# Patient Record
Sex: MALE | Race: WHITE | NOT HISPANIC OR LATINO | Employment: OTHER | URBAN - METROPOLITAN AREA
[De-identification: names, ages, dates, MRNs, and addresses within clinical notes are randomized per-mention and may not be internally consistent; named-entity substitution may affect disease eponyms.]

---

## 2017-01-31 ENCOUNTER — OFFICE VISIT (OUTPATIENT)
Dept: INTERNAL MEDICINE | Facility: CLINIC | Age: 57
End: 2017-01-31
Payer: MEDICARE

## 2017-01-31 VITALS
HEART RATE: 72 BPM | DIASTOLIC BLOOD PRESSURE: 80 MMHG | RESPIRATION RATE: 18 BRPM | OXYGEN SATURATION: 98 % | BODY MASS INDEX: 21.29 KG/M2 | HEIGHT: 66 IN | TEMPERATURE: 98 F | WEIGHT: 132.5 LBS | SYSTOLIC BLOOD PRESSURE: 118 MMHG

## 2017-01-31 DIAGNOSIS — K50.919 CROHN'S DISEASE WITH COMPLICATION, UNSPECIFIED GASTROINTESTINAL TRACT LOCATION: ICD-10-CM

## 2017-01-31 DIAGNOSIS — E78.2 HYPERLIPIDEMIA, MIXED: Chronic | ICD-10-CM

## 2017-01-31 DIAGNOSIS — R19.8 RUQ FULLNESS: ICD-10-CM

## 2017-01-31 DIAGNOSIS — E80.4 GILBERTS SYNDROME: Chronic | ICD-10-CM

## 2017-01-31 DIAGNOSIS — F51.05 INSOMNIA DUE TO ANXIETY AND FEAR: Chronic | ICD-10-CM

## 2017-01-31 DIAGNOSIS — J32.9 SINUSITIS, UNSPECIFIED CHRONICITY, UNSPECIFIED LOCATION: Primary | ICD-10-CM

## 2017-01-31 DIAGNOSIS — B20 HIV DISEASE: ICD-10-CM

## 2017-01-31 DIAGNOSIS — F41.9 ANXIETY: ICD-10-CM

## 2017-01-31 DIAGNOSIS — F40.9 INSOMNIA DUE TO ANXIETY AND FEAR: Chronic | ICD-10-CM

## 2017-01-31 PROCEDURE — 99214 OFFICE O/P EST MOD 30 MIN: CPT | Mod: S$GLB,,, | Performed by: INTERNAL MEDICINE

## 2017-01-31 RX ORDER — LORAZEPAM 2 MG/1
2 TABLET ORAL NIGHTLY
Qty: 30 TABLET | Refills: 5 | Status: SHIPPED | OUTPATIENT
Start: 2017-01-31 | End: 2017-03-02 | Stop reason: SDUPTHER

## 2017-01-31 RX ORDER — FENOFIBRATE 145 MG/1
145 TABLET, FILM COATED ORAL DAILY
Qty: 90 TABLET | Refills: 3 | Status: SHIPPED | OUTPATIENT
Start: 2017-01-31 | End: 2017-06-27 | Stop reason: SDUPTHER

## 2017-01-31 RX ORDER — OMEGA-3-ACID ETHYL ESTERS 1 G/1
1 CAPSULE, LIQUID FILLED ORAL 2 TIMES DAILY
Qty: 180 CAPSULE | Refills: 3 | Status: SHIPPED | OUTPATIENT
Start: 2017-01-31 | End: 2017-06-27 | Stop reason: SDUPTHER

## 2017-01-31 RX ORDER — METHYLPREDNISOLONE 4 MG/1
TABLET ORAL
Qty: 1 PACKAGE | Refills: 0 | Status: SHIPPED | OUTPATIENT
Start: 2017-01-31 | End: 2017-06-27

## 2017-01-31 RX ORDER — CLONAZEPAM 1 MG/1
1 TABLET ORAL DAILY
Qty: 30 TABLET | Refills: 5 | Status: SHIPPED | OUTPATIENT
Start: 2017-01-31 | End: 2017-03-02 | Stop reason: SDUPTHER

## 2017-01-31 RX ORDER — PRAVASTATIN SODIUM 20 MG/1
20 TABLET ORAL NIGHTLY
Qty: 90 TABLET | Refills: 3 | Status: SHIPPED | OUTPATIENT
Start: 2017-01-31 | End: 2017-06-27 | Stop reason: SDUPTHER

## 2017-01-31 RX ORDER — KETOCONAZOLE 20 MG/ML
SHAMPOO, SUSPENSION TOPICAL
COMMUNITY
Start: 2017-01-04 | End: 2017-06-27 | Stop reason: SDUPTHER

## 2017-01-31 RX ORDER — SILDENAFIL 100 MG/1
100 TABLET, FILM COATED ORAL DAILY PRN
Qty: 10 TABLET | Refills: 3 | Status: SHIPPED | OUTPATIENT
Start: 2017-01-31 | End: 2017-08-24 | Stop reason: SDUPTHER

## 2017-01-31 NOTE — PROGRESS NOTES
"Subjective:      Patient ID: Clint England is a 56 y.o. male.    Chief Complaint: Otalgia; Headache; and Medication change (Insurance is no longer covering Mometasone Furoate 50mcg spray)    HPI: 56y/oWM, litany of observations/complaints:  - right ear stopped up for quite awhile. He tried Auralgan on several occasions, but it just " ran out of his ear".  - dizziness, nino. When he is on a ladder..  - feels like his RUQ area is bigger than his left, and has often noticed it and wants to know why.  -had a bladder cancer, and want to know if it is everywhere, and what testing can he have for this  (sees Dr. Forte for this).  -told he has Crohn's ( by Dr. Wong), no current attacks.  -can't sleep, anxious,worried.  -sees derm/rahel for hair....  Review of Systems   HENT: Positive for ear pain, facial swelling, postnasal drip, sinus pressure, sneezing and tinnitus.         Exposures: dust,sanding,painting,cleaning.  Use and overuse of nasal sprays.   Respiratory: Negative for cough and shortness of breath.    Cardiovascular: Negative for chest pain and palpitations.   Gastrointestinal: Negative.    Genitourinary: Negative.    Neurological: Positive for dizziness and light-headedness. Negative for seizures.   All other systems reviewed and are negative.      Objective:     Visit Vitals    /80 (BP Location: Right arm, Patient Position: Sitting, BP Method: Manual)    Pulse 72    Temp 97.9 °F (36.6 °C) (Oral)    Resp 18    Ht 5' 6" (1.676 m)    Wt 60.1 kg (132 lb 7.9 oz)    SpO2 98%    BMI 21.39 kg/m2       Physical Exam   Constitutional: He is oriented to person, place, and time. He appears well-developed and well-nourished.   HENT:   Head: Normocephalic and atraumatic.   Right Ear: Tympanic membrane, external ear and ear canal normal.   Left Ear: Tympanic membrane, external ear and ear canal normal.   Nose: Nose normal.   Mouth/Throat: Oropharynx is clear and moist.   Eyes: Conjunctivae and EOM are " normal. Pupils are equal, round, and reactive to light.   Neck: Normal range of motion. Neck supple.   Cardiovascular: Normal rate, regular rhythm and normal heart sounds.    Pulmonary/Chest: Effort normal and breath sounds normal.   Abdominal: Soft. Normal appearance and bowel sounds are normal. He exhibits no shifting dullness. There is no hepatosplenomegaly. There is no tenderness. There is no CVA tenderness. No hernia.   Musculoskeletal: Normal range of motion.   Neurological: He is alert and oriented to person, place, and time.   Skin: Skin is warm and dry.   Psychiatric: He has a normal mood and affect. His behavior is normal.   Nursing note and vitals reviewed.      Assessment:     1. Sinusitis, unspecified chronicity, unspecified location    2. HIV disease    3. RUQ fullness    4. Hyperlipidemia, mixed    5. Insomnia due to anxiety and fear    6. Anxiety    7. Crohn's disease with complication, unspecified gastrointestinal tract location    8. [place] syndrome      Plan:     Sinusitis, unspecified chronicity, unspecified location  -     methylPREDNISolone (MEDROL DOSEPACK) 4 mg tablet; use as directed  Dispense: 1 Package; Refill: 0  -     CBC auto differential; Future; Expected date: 1/31/17  -     Comprehensive metabolic panel; Future; Expected date: 1/31/17  -     Ambulatory referral to Smoking Cessation Program    HIV disease  -     raltegravir (ISENTRESS) 400 mg tablet; Take 1 tablet (400 mg total) by mouth 2 (two) times daily.  Dispense: 180 tablet; Refill: 3  -     emtricitab-rilpivirine-tenofov (COMPLERA) 200- mg Tab; Take 1 tablet by mouth once daily.  Dispense: 90 tablet; Refill: 3  -     CD4 T-[place] Cells; Future; Expected date: 1/31/17  -     HIV RNA, quantitative, PCR; Future; Expected date: 1/31/17    RUQ fullness  -     Cancel: CT Abdomen Pelvis With Contrast; Future; Expected date: 1/31/17  -     CT Abdomen Pelvis W Wo Contrast; Future; Expected date: 1/31/17    Hyperlipidemia,  mixed  -     omega-3 acid ethyl esters (LOVAZA) 1 gram capsule; Take 1 capsule (1 g total) by mouth 2 (two) times daily.  Dispense: 180 capsule; Refill: 3  -     pravastatin (PRAVACHOL) 20 MG tablet; Take 1 tablet (20 mg total) by mouth nightly.  Dispense: 90 tablet; Refill: 3  -     fenofibrate (TRICOR) 145 MG tablet; Take 1 tablet (145 mg total) by mouth once daily.  Dispense: 90 tablet; Refill: 3  -     Lipid panel; Future; Expected date: 1/31/17    Insomnia due to anxiety and fear  -     lorazepam (ATIVAN) 2 MG Tab; Take 1 tablet (2 mg total) by mouth every evening.  Dispense: 30 tablet; Refill: 5    Anxiety  -     clonazePAM (KLONOPIN) 1 MG tablet; Take 1 tablet (1 mg total) by mouth once daily.  Dispense: 30 tablet; Refill: 5    Crohn's disease with complication, unspecified gastrointestinal tract location    [place] syndrome    Other orders  -     sildenafil (VIAGRA) 100 MG tablet; Take 1 tablet (100 mg total) by mouth daily as needed for Erectile Dysfunction.  Dispense: 10 tablet; Refill: 3

## 2017-01-31 NOTE — MR AVS SNAPSHOT
[facility]  [address],  [address]  [place] [address]  Phone: [phone]  Fax: [phone]                  Clint England   2017 10:00 AM   Office Visit    Description:  Male : 1960   Provider:  Felisha Ruiz MD   Department:  [facility]           Reason for Visit     Otalgia     Headache     Medication change           Diagnoses this Visit        Comments    Sinusitis, unspecified chronicity, unspecified location    -  Primary     HIV disease         RUQ fullness         Hyperlipidemia, mixed         Insomnia due to anxiety and fear         Anxiety         Crohn's disease with complication, unspecified gastrointestinal tract location         [place] syndrome                To Do List           Goals (5 Years of Data)     None       These Medications        Disp Refills Start End    sildenafil (VIAGRA) 100 MG tablet 10 tablet 3 2017     Take 1 tablet (100 mg total) by mouth daily as needed for Erectile Dysfunction. - Oral    Pharmacy: [facility] - [place], [place] - [address] Ph #: [phone]       raltegravir (ISENTRESS) 400 mg tablet 180 tablet 3 2017     Take 1 tablet (400 mg total) by mouth 2 (two) times daily. - Oral    Pharmacy: [facility] - [place], [place] - [address] Ph #: [phone]       omega-3 acid ethyl esters (LOVAZA) 1 gram capsule 180 capsule 3 2017     Take 1 capsule (1 g total) by mouth 2 (two) times daily. - Oral    Pharmacy: [facility] - [place], [place] - [address] Ph #: [phone]       pravastatin (PRAVACHOL) 20 MG tablet 90 tablet 3 2017     Take 1 tablet (20 mg total) by mouth nightly. - Oral    Pharmacy: [facility] - [place], [place] - [address] Ph #: [phone]       lorazepam (ATIVAN) 2 MG Tab 30 tablet 5 2017     Take 1 tablet (2 mg total) by mouth every evening. - Oral    Pharmacy: [facility] - [place], [place] - [address] Ph #: [phone]       fenofibrate (TRICOR) 145 MG tablet 90 tablet 3 1/31/2017 1/31/2018    Take 1 tablet (145 mg total) by mouth once daily. - Oral    Pharmacy: [facility] - [place], [place] - [address] Ph #: [phone]       emtricitab-rilpivirine-tenofov (COMPLERA) 200- mg Tab 90 tablet 3 1/31/2017     Take 1 tablet by mouth once daily. - Oral    Pharmacy: [facility] - [place], [place] - [address] Ph #: [phone]       clonazePAM (KLONOPIN) 1 MG tablet 30 tablet 5 1/31/2017     Take 1 tablet (1 mg total) by mouth once daily. - Oral    Pharmacy: [facility] - [place], [place] - [address] Ph #: [phone]       methylPREDNISolone (MEDROL DOSEPACK) 4 mg tablet 1 Package 0 1/31/2017     use as directed    Pharmacy: [facility] - [place], [place] - [address] Ph #: [phone]         [facility]     [facility] On [facility] Nurse Care Line - 24/7 Assistance  Registered nurses in the [facility] Center provide clinical advisement, health education, appointment booking, and other advisory services.  Call for this free service at [phone].             Medications           START taking these NEW medications        Refills    methylPREDNISolone (MEDROL DOSEPACK) 4 mg tablet 0    Sig: use as directed    Class: Normal      STOP taking these medications     gemfibrozil (LOPID) 600 MG tablet Take 1 tablet (600 mg total) by mouth 2 (two) times daily before meals.    mometasone (NASONEX) 50 mcg/actuation nasal spray 1 spray by Nasal route once daily.           Verify that the below list of medications is an accurate representation of the medications you are currently taking.  If none reported, the list may be blank. If incorrect, please contact your healthcare provider. Carry this list with you in case of emergency.           Current Medications     clonazePAM (KLONOPIN) 1 MG tablet Take 1  "tablet (1 mg total) by mouth once daily.    emtricitab-rilpivirine-tenofov (COMPLERA) 200- mg Tab Take 1 tablet by mouth once daily.    fenofibrate (TRICOR) 145 MG tablet Take 1 tablet (145 mg total) by mouth once daily.    ketoconazole (NIZORAL) 2 % shampoo Apply topically.    lorazepam (ATIVAN) 2 MG Tab Take 1 tablet (2 mg total) by mouth every evening.    mesalamine (LIALDA) 1.2 gram TbEC Take 1.2 g by mouth 2 (two) times daily.    methylPREDNISolone (MEDROL DOSEPACK) 4 mg tablet use as directed    omega-3 acid ethyl esters (LOVAZA) 1 gram capsule Take 1 capsule (1 g total) by mouth 2 (two) times daily.    pravastatin (PRAVACHOL) 20 MG tablet Take 1 tablet (20 mg total) by mouth nightly.    raltegravir (ISENTRESS) 400 mg tablet Take 1 tablet (400 mg total) by mouth 2 (two) times daily.    sildenafil (VIAGRA) 100 MG tablet Take 1 tablet (100 mg total) by mouth daily as needed for Erectile Dysfunction.           Clinical Reference Information           Vital Signs - Last Recorded  Most recent update: 1/31/2017 10:39 AM by Alycia Ibanez LPN    BP Pulse Temp Resp Ht Wt    118/80 (BP Location: Right arm, Patient Position: Sitting, BP Method: Manual) 72 97.9 °F (36.6 °C) (Oral) 18 5' 6" (1.676 m) 60.1 kg (132 lb 7.9 oz)    SpO2 BMI             98% 21.39 kg/m2         Blood Pressure          Most Recent Value    BP  118/80      Allergies as of 1/31/2017     No Known Allergies      Immunizations Administered on Date of Encounter - 1/31/2017     None      Orders Placed During Today's Visit      Normal Orders This Visit    Ambulatory referral to Smoking Cessation Program     CT Abdomen Pelvis W Wo Contrast     Future Labs/Procedures Expected by Expires    CBC auto differential  1/31/2017 1/31/2018    CD4 T-[place] Cells  1/31/2017 4/1/2018    Comprehensive metabolic panel  1/31/2017 1/31/2018    CT Abdomen Pelvis W Wo Contrast  1/31/2017 1/31/2018    HIV RNA, quantitative, PCR  1/31/2017 4/1/2018    Lipid panel  " 1/31/2017 1/31/2018      Smoking Cessation     If you would like to quit smoking:   You may be eligible for free services if you are a [place] resident and started smoking cigarettes before September 1, 1988.  Call the [employer]oking [employer]essation [employer] ([facility]) toll free at [phone] or [phone].   Call 7-800-QUIT-NOW if you do not meet the above criteria.

## 2017-02-08 LAB
ALBUMIN SERPL-MCNC: 4.7 G/DL (ref 3.6–5.1)
ALBUMIN/GLOB SERPL: 1.6 (CALC) (ref 1–2.5)
ALP SERPL-CCNC: 44 U/L (ref 40–115)
ALT SERPL-CCNC: 19 U/L (ref 9–46)
AST SERPL-CCNC: 17 U/L (ref 10–35)
BASOPHILS # BLD AUTO: 38 CELLS/UL (ref 0–200)
BASOPHILS NFR BLD AUTO: 0.4 %
BILIRUB SERPL-MCNC: 0.5 MG/DL (ref 0.2–1.2)
BUN SERPL-MCNC: 23 MG/DL (ref 7–25)
BUN/CREAT SERPL: NORMAL (CALC) (ref 6–22)
CALCIUM SERPL-MCNC: 10.2 MG/DL (ref 8.6–10.3)
CD3+CD4+ CELLS # BLD: 1354 CELLS/UL (ref 490–1740)
CD3+CD4+ CELLS NFR BLD: 40 % (ref 30–61)
CD3+CD4+ CELLS/CD3+CD8+ CLL BLD: 1.16 % (ref 0.86–5)
CD3+CD8+ CELLS # BLD: 1172 CELLS/UL (ref 180–1170)
CD3+CD8+ CELLS NFR BLD: 34 % (ref 12–42)
CHLORIDE SERPL-SCNC: 106 MMOL/L (ref 98–110)
CHOLEST SERPL-MCNC: 180 MG/DL (ref 125–200)
CHOLEST/HDLC SERPL: 4.5 (CALC)
CO2 SERPL-SCNC: 26 MMOL/L (ref 20–31)
CREAT SERPL-MCNC: 1.29 MG/DL (ref 0.7–1.33)
EOSINOPHIL # BLD AUTO: 122 CELLS/UL (ref 15–500)
EOSINOPHIL NFR BLD AUTO: 1.3 %
ERYTHROCYTE [DISTWIDTH] IN BLOOD BY AUTOMATED COUNT: 13.9 % (ref 11–15)
GFR SERPL CREATININE-BSD FRML MDRD: 62 ML/MIN/1.73M2
GLOBULIN SER CALC-MCNC: 3 G/DL (CALC) (ref 1.9–3.7)
GLUCOSE SERPL-MCNC: 74 MG/DL (ref 65–99)
HCT VFR BLD AUTO: 50.1 % (ref 38.5–50)
HCV RNA SERPL NAA+PROBE-ACNC: NORMAL IU/ML
HCV RNA SERPL NAA+PROBE-LOG IU: NORMAL LOG IU/ML
HDLC SERPL-MCNC: 40 MG/DL
HGB BLD-MCNC: 16.6 G/DL (ref 13.2–17.1)
LDLC SERPL CALC-MCNC: 98 MG/DL (CALC)
LYMPHOCYTES # BLD AUTO: 3177 CELLS/UL (ref 850–3900)
LYMPHOCYTES # BLD AUTO: 3412 CELLS/UL (ref 850–3900)
LYMPHOCYTES NFR BLD AUTO: 33.8 %
MCH RBC QN AUTO: 31.8 PG (ref 27–33)
MCHC RBC AUTO-ENTMCNC: 33.2 G/DL (ref 32–36)
MCV RBC AUTO: 95.7 FL (ref 80–100)
MONOCYTES # BLD AUTO: 602 CELLS/UL (ref 200–950)
MONOCYTES NFR BLD AUTO: 6.4 %
NEUTROPHILS # BLD AUTO: 5461 CELLS/UL (ref 1500–7800)
NEUTROPHILS NFR BLD AUTO: 58.1 %
NONHDLC SERPL-MCNC: 140 MG/DL (CALC)
NOTE: NORMAL
PLATELET # BLD AUTO: 360 THOUSAND/UL (ref 140–400)
PMV BLD REES-ECKER: 7.9 FL (ref 7.5–12.5)
POTASSIUM SERPL-SCNC: 4.6 MMOL/L (ref 3.5–5.3)
PROT SERPL-MCNC: 7.7 G/DL (ref 6.1–8.1)
RBC # BLD AUTO: 5.23 MILLION/UL (ref 4.2–5.8)
SODIUM SERPL-SCNC: 140 MMOL/L (ref 135–146)
TRIGL SERPL-MCNC: 209 MG/DL
WBC # BLD AUTO: 9.4 THOUSAND/UL (ref 3.8–10.8)

## 2017-03-02 DIAGNOSIS — F40.9 INSOMNIA DUE TO ANXIETY AND FEAR: Chronic | ICD-10-CM

## 2017-03-02 DIAGNOSIS — F41.9 ANXIETY: ICD-10-CM

## 2017-03-02 DIAGNOSIS — F51.05 INSOMNIA DUE TO ANXIETY AND FEAR: Chronic | ICD-10-CM

## 2017-03-03 RX ORDER — MOMETASONE FUROATE 50 UG/1
2 SPRAY, METERED NASAL DAILY
Qty: 17 G | Refills: 3 | Status: SHIPPED | OUTPATIENT
Start: 2017-03-03 | End: 2017-06-27 | Stop reason: SDUPTHER

## 2017-03-03 RX ORDER — LORAZEPAM 2 MG/1
2 TABLET ORAL NIGHTLY
Qty: 30 TABLET | Refills: 5 | Status: SHIPPED | OUTPATIENT
Start: 2017-03-03 | End: 2017-06-27 | Stop reason: SDUPTHER

## 2017-03-03 RX ORDER — CLONAZEPAM 1 MG/1
1 TABLET ORAL DAILY
Qty: 30 TABLET | Refills: 5 | Status: SHIPPED | OUTPATIENT
Start: 2017-03-03 | End: 2017-06-27 | Stop reason: SDUPTHER

## 2017-06-27 ENCOUNTER — OFFICE VISIT (OUTPATIENT)
Dept: INTERNAL MEDICINE | Facility: CLINIC | Age: 57
End: 2017-06-27
Payer: MEDICARE

## 2017-06-27 VITALS
TEMPERATURE: 98 F | RESPIRATION RATE: 18 BRPM | BODY MASS INDEX: 19.95 KG/M2 | OXYGEN SATURATION: 97 % | HEIGHT: 66 IN | WEIGHT: 124.13 LBS | HEART RATE: 67 BPM | DIASTOLIC BLOOD PRESSURE: 62 MMHG | SYSTOLIC BLOOD PRESSURE: 112 MMHG

## 2017-06-27 DIAGNOSIS — B20 HIV DISEASE: ICD-10-CM

## 2017-06-27 DIAGNOSIS — E78.2 HYPERLIPIDEMIA, MIXED: Chronic | ICD-10-CM

## 2017-06-27 DIAGNOSIS — R42 VERTIGO: ICD-10-CM

## 2017-06-27 DIAGNOSIS — L26 DERMATITIS, EXFOLIATIVE: ICD-10-CM

## 2017-06-27 DIAGNOSIS — F40.9 INSOMNIA DUE TO ANXIETY AND FEAR: Chronic | ICD-10-CM

## 2017-06-27 DIAGNOSIS — J30.89 ALLERGIC RHINITIS DUE TO OTHER ALLERGIC TRIGGER, UNSPECIFIED RHINITIS SEASONALITY: Primary | ICD-10-CM

## 2017-06-27 DIAGNOSIS — F51.05 INSOMNIA DUE TO ANXIETY AND FEAR: Chronic | ICD-10-CM

## 2017-06-27 DIAGNOSIS — F41.9 ANXIETY: ICD-10-CM

## 2017-06-27 PROCEDURE — 99214 OFFICE O/P EST MOD 30 MIN: CPT | Mod: S$GLB,,, | Performed by: INTERNAL MEDICINE

## 2017-06-27 RX ORDER — CLONAZEPAM 1 MG/1
1 TABLET ORAL DAILY
Qty: 30 TABLET | Refills: 5 | Status: SHIPPED | OUTPATIENT
Start: 2017-06-27 | End: 2018-02-08 | Stop reason: SDUPTHER

## 2017-06-27 RX ORDER — LORAZEPAM 2 MG/1
2 TABLET ORAL NIGHTLY
Qty: 30 TABLET | Refills: 5 | Status: SHIPPED | OUTPATIENT
Start: 2017-06-27 | End: 2018-02-08 | Stop reason: SDUPTHER

## 2017-06-27 RX ORDER — MOMETASONE FUROATE 50 UG/1
2 SPRAY, METERED NASAL DAILY
Qty: 17 G | Refills: 3 | Status: SHIPPED | OUTPATIENT
Start: 2017-06-27 | End: 2017-08-24 | Stop reason: SDUPTHER

## 2017-06-27 RX ORDER — PRAVASTATIN SODIUM 20 MG/1
20 TABLET ORAL NIGHTLY
Qty: 90 TABLET | Refills: 3 | Status: SHIPPED | OUTPATIENT
Start: 2017-06-27 | End: 2017-08-24 | Stop reason: SDUPTHER

## 2017-06-27 RX ORDER — FENOFIBRATE 145 MG/1
145 TABLET, FILM COATED ORAL DAILY
Qty: 90 TABLET | Refills: 3 | Status: SHIPPED | OUTPATIENT
Start: 2017-06-27 | End: 2017-08-24 | Stop reason: SDUPTHER

## 2017-06-27 RX ORDER — OMEGA-3-ACID ETHYL ESTERS 1 G/1
1 CAPSULE, LIQUID FILLED ORAL 2 TIMES DAILY
Qty: 180 CAPSULE | Refills: 3 | Status: SHIPPED | OUTPATIENT
Start: 2017-06-27 | End: 2017-08-24 | Stop reason: SDUPTHER

## 2017-06-27 RX ORDER — KETOCONAZOLE 20 MG/ML
SHAMPOO, SUSPENSION TOPICAL DAILY
Qty: 120 ML | Refills: 5 | Status: SHIPPED | OUTPATIENT
Start: 2017-06-27 | End: 2018-02-08

## 2017-06-27 NOTE — PROGRESS NOTES
"Subjective:      Patient ID: Clint England is a 56 y.o. male.    Chief Complaint: Results (lab results); Medication Refill; and Fall (pt fell hit big toe, slight pain)    HPI: 56y/oWM went to an ENT doctor, who did a hearing test and an MRI of head, who  "did not know what is causing his vertigo", and referred him to a Neurologist. So far, with  [facility], has only seen a NP, who gave him Betahistine and a " steroid".  Finished the steroid, still on Betahistine.  Wants to loose weight for his upcoming cruise.  Has had multiple falls thought to be secondary to vertigo.    CD4   1,354     Viral load not done.  See rest of labs in labs.  Reviewed all with pt.    Review of Systems   Constitutional: Negative.    HENT: Positive for congestion, postnasal drip and rhinorrhea.    Eyes: Negative.    Respiratory: Negative.    Cardiovascular: Negative.    Gastrointestinal: Negative.    Endocrine: Negative.    Genitourinary: Negative.    Musculoskeletal: Negative.    Skin: Negative.         Worries about loosing his hair.   Allergic/Immunologic: Positive for immunocompromised state.   Neurological: Positive for dizziness, light-headedness and headaches.   Hematological: Negative.    Psychiatric/Behavioral: Positive for sleep disturbance. Negative for self-injury. The patient is nervous/anxious.        Objective:   /62 (BP Location: Left arm, Patient Position: Sitting, BP Method: Manual)   Pulse 67   Temp 98.1 °F (36.7 °C) (Oral)   Resp 18   Ht 5' 6" (1.676 m)   Wt 56.3 kg (124 lb 1.9 oz)   SpO2 97%   BMI 20.03 kg/m²     Physical Exam   Constitutional: He is oriented to person, place, and time. He appears well-developed and well-nourished.   HENT:   Head: Normocephalic and atraumatic.   Right Ear: External ear normal.   Left Ear: External ear normal.   Nose: Nose normal.   Mouth/Throat: Oropharynx is clear and moist.   Eyes: Conjunctivae and EOM are normal. Pupils are equal, round, and reactive to light. "   Neck: Trachea normal and normal range of motion. Neck supple. Normal carotid pulses, no hepatojugular reflux and no JVD present. Carotid bruit is not present. No Brudzinski's sign and no Kernig's sign noted. No thyromegaly present.   Cardiovascular: Normal rate, regular rhythm and normal heart sounds.    Pulmonary/Chest: Effort normal and breath sounds normal.   Abdominal: Soft. Bowel sounds are normal.   Musculoskeletal: Normal range of motion.   Lymphadenopathy:     He has no cervical adenopathy.   Neurological: He is alert and oriented to person, place, and time. He has normal strength. He displays normal reflexes. No cranial nerve deficit or sensory deficit. He exhibits normal muscle tone. Coordination normal.   Skin: Skin is warm and dry.   Psychiatric: He has a normal mood and affect. His behavior is normal.   Nursing note and vitals reviewed.      Assessment:     1. Allergic rhinitis due to other allergic trigger, unspecified rhinitis seasonality    2. HIV disease    3. Hyperlipidemia, mixed    4. Insomnia due to anxiety and fear    5. Anxiety    6. Dermatitis, exfoliative    7. Vertigo      Plan:     Allergic rhinitis due to other allergic trigger, unspecified rhinitis seasonality  -     mometasone (NASONEX) 50 mcg/actuation nasal spray; 2 sprays by Nasal route once daily.  Dispense: 17 g; Refill: 3    HIV disease  -     emtricita-rilpivirine-tenof DF (COMPLERA) 200- mg Tab; Take 1 tablet by mouth once daily.  Dispense: 90 tablet; Refill: 3  -     raltegravir (ISENTRESS) 400 mg tablet; Take 1 tablet (400 mg total) by mouth 2 (two) times daily.  Dispense: 180 tablet; Refill: 3    Hyperlipidemia, mixed  -     pravastatin (PRAVACHOL) 20 MG tablet; Take 1 tablet (20 mg total) by mouth nightly.  Dispense: 90 tablet; Refill: 3  -     fenofibrate (TRICOR) 145 MG tablet; Take 1 tablet (145 mg total) by mouth once daily.  Dispense: 90 tablet; Refill: 3  -     omega-3 acid ethyl esters (LOVAZA) 1 gram capsule;  Take 1 capsule (1 g total) by mouth 2 (two) times daily.  Dispense: 180 capsule; Refill: 3    Insomnia due to anxiety and fear  -     lorazepam (ATIVAN) 2 MG Tab; Take 1 tablet (2 mg total) by mouth every evening.  Dispense: 30 tablet; Refill: 5    Anxiety  -     clonazePAM (KLONOPIN) 1 MG tablet; Take 1 tablet (1 mg total) by mouth once daily.  Dispense: 30 tablet; Refill: 5    Dermatitis, exfoliative  -     ketoconazole (NIZORAL) 2 % shampoo; Apply topically once daily at 6am.  Dispense: 120 mL; Refill: 5    Vertigo    Get MRI report from Dr. Cade's office.

## 2017-06-28 RX ORDER — AZELASTINE 1 MG/ML
1 SPRAY, METERED NASAL 2 TIMES DAILY
Qty: 30 ML | Refills: 5 | Status: SHIPPED | OUTPATIENT
Start: 2017-06-28 | End: 2017-08-24 | Stop reason: SDUPTHER

## 2017-08-24 ENCOUNTER — OFFICE VISIT (OUTPATIENT)
Dept: INTERNAL MEDICINE | Facility: CLINIC | Age: 57
End: 2017-08-24
Payer: MEDICARE

## 2017-08-24 VITALS
RESPIRATION RATE: 18 BRPM | OXYGEN SATURATION: 97 % | BODY MASS INDEX: 20.3 KG/M2 | WEIGHT: 126.31 LBS | HEIGHT: 66 IN | DIASTOLIC BLOOD PRESSURE: 60 MMHG | HEART RATE: 78 BPM | SYSTOLIC BLOOD PRESSURE: 100 MMHG | TEMPERATURE: 98 F

## 2017-08-24 DIAGNOSIS — J30.89 CHRONIC ALLERGIC RHINITIS DUE TO OTHER ALLERGIC TRIGGER, UNSPECIFIED SEASONALITY: Primary | ICD-10-CM

## 2017-08-24 DIAGNOSIS — E78.2 HYPERLIPIDEMIA, MIXED: Chronic | ICD-10-CM

## 2017-08-24 DIAGNOSIS — G62.9 NEUROPATHY: ICD-10-CM

## 2017-08-24 DIAGNOSIS — B20 HIV DISEASE: ICD-10-CM

## 2017-08-24 PROCEDURE — 99214 OFFICE O/P EST MOD 30 MIN: CPT | Mod: S$GLB,,, | Performed by: INTERNAL MEDICINE

## 2017-08-24 RX ORDER — SILDENAFIL 100 MG/1
100 TABLET, FILM COATED ORAL DAILY PRN
Qty: 10 TABLET | Refills: 3 | Status: SHIPPED | OUTPATIENT
Start: 2017-08-24 | End: 2018-07-18 | Stop reason: SDUPTHER

## 2017-08-24 RX ORDER — OMEGA-3-ACID ETHYL ESTERS 1 G/1
1 CAPSULE, LIQUID FILLED ORAL 2 TIMES DAILY
Qty: 180 CAPSULE | Refills: 3 | Status: SHIPPED | OUTPATIENT
Start: 2017-08-24 | End: 2018-02-08 | Stop reason: SDUPTHER

## 2017-08-24 RX ORDER — PRAVASTATIN SODIUM 20 MG/1
20 TABLET ORAL NIGHTLY
Qty: 90 TABLET | Refills: 3 | Status: SHIPPED | OUTPATIENT
Start: 2017-08-24 | End: 2018-02-08 | Stop reason: SDUPTHER

## 2017-08-24 RX ORDER — MOMETASONE FUROATE 50 UG/1
2 SPRAY, METERED NASAL DAILY
Qty: 17 G | Refills: 3 | Status: SHIPPED | OUTPATIENT
Start: 2017-08-24 | End: 2019-04-10 | Stop reason: SDUPTHER

## 2017-08-24 RX ORDER — FENOFIBRATE 145 MG/1
145 TABLET, FILM COATED ORAL DAILY
Qty: 90 TABLET | Refills: 3 | Status: SHIPPED | OUTPATIENT
Start: 2017-08-24 | End: 2017-12-21 | Stop reason: SDUPTHER

## 2017-08-24 RX ORDER — TRIAMCINOLONE ACETONIDE 5 MG/G
CREAM TOPICAL
COMMUNITY
Start: 2017-07-31 | End: 2018-02-08

## 2017-08-24 RX ORDER — AZELASTINE 1 MG/ML
1 SPRAY, METERED NASAL 2 TIMES DAILY
Qty: 30 ML | Refills: 5 | Status: SHIPPED | OUTPATIENT
Start: 2017-08-24 | End: 2018-07-18

## 2017-08-24 RX ORDER — CEPHALEXIN 500 MG/1
1 CAPSULE ORAL 3 TIMES DAILY
COMMUNITY
Start: 2017-08-17 | End: 2018-02-08

## 2017-08-24 NOTE — PROGRESS NOTES
"Subjective:      Patient ID: Clint England is a 57 y.o. male.    Chief Complaint: No chief complaint on file.    HPI: 57 y.o. White male , yesterday had 4 hrs of hearing testing.  Also has had bladder rechecked, and no cancer.       Review of Systems   Constitutional: Negative.    HENT: Positive for hearing loss.    Eyes: Negative.    Respiratory: Negative.    Cardiovascular: Negative.    Gastrointestinal: Positive for abdominal pain and diarrhea.   Endocrine: Negative.    Genitourinary: Positive for frequency and urgency.   Musculoskeletal: Positive for back pain.   Neurological: Positive for dizziness, numbness and headaches.        Vertigo   Hematological: Negative.    Psychiatric/Behavioral: Positive for sleep disturbance. The patient is nervous/anxious.        Objective:   /60 (BP Location: Right arm, Patient Position: Sitting, BP Method: Medium (Manual))   Pulse 78   Temp 98 °F (36.7 °C) (Oral)   Resp 18   Ht 5' 6" (1.676 m)   Wt 57.3 kg (126 lb 5.2 oz)   SpO2 97%   BMI 20.39 kg/m²     Physical Exam   Constitutional: He is oriented to person, place, and time. He appears well-developed and well-nourished.   HENT:   Head: Normocephalic and atraumatic.   Right Ear: External ear normal.   Left Ear: External ear normal.   Nose: Nose normal.   Mouth/Throat: Oropharynx is clear and moist.   Eyes: Conjunctivae and EOM are normal. Pupils are equal, round, and reactive to light.   Neck: Normal range of motion.   Cardiovascular: Normal rate, regular rhythm and normal heart sounds.    Pulmonary/Chest: Effort normal and breath sounds normal.   Abdominal: Soft. Bowel sounds are normal.   Musculoskeletal: Normal range of motion.   Neurological: He is alert and oriented to person, place, and time.   Skin: Skin is warm and dry.   Psychiatric: He has a normal mood and affect. His behavior is normal.   Nursing note and vitals reviewed.      Assessment:     1. Chronic allergic rhinitis due to other allergic " trigger, unspecified seasonality    2. Neuropathy    3. HIV disease    4. Hyperlipidemia, mixed      Plan:     Chronic allergic rhinitis due to other allergic trigger, unspecified seasonality  -     mometasone (NASONEX) 50 mcg/actuation nasal spray; 2 sprays by Nasal route once daily.  Dispense: 17 g; Refill: 3  -     azelastine (ASTELIN) 137 mcg (0.1 %) nasal spray; 1 spray (137 mcg total) by Nasal route 2 (two) times daily.  Dispense: 30 mL; Refill: 5    Neuropathy    HIV disease  -     raltegravir (ISENTRESS) 400 mg tablet; Take 1 tablet (400 mg total) by mouth 2 (two) times daily.  Dispense: 180 tablet; Refill: 3  -     emtricita-rilpivirine-tenof DF (COMPLERA) 200- mg Tab; Take 1 tablet by mouth once daily.  Dispense: 90 tablet; Refill: 3  -     CBC auto differential; Future; Expected date: 08/24/2017  -     Comprehensive metabolic panel; Future; Expected date: 08/24/2017  -     HIV RNA, quantitative, PCR; Future; Expected date: 08/24/2017  -     Urinalysis; Future; Expected date: 08/24/2017  -     Hepatitis C antibody; Future; Expected date: 08/24/2017  -     CD4 T-[place] Cells; Future; Expected date: 12/22/2017    Hyperlipidemia, mixed  -     pravastatin (PRAVACHOL) 20 MG tablet; Take 1 tablet (20 mg total) by mouth nightly.  Dispense: 90 tablet; Refill: 3  -     omega-3 acid ethyl esters (LOVAZA) 1 gram capsule; Take 1 capsule (1 g total) by mouth 2 (two) times daily.  Dispense: 180 capsule; Refill: 3  -     fenofibrate (TRICOR) 145 MG tablet; Take 1 tablet (145 mg total) by mouth once daily.  Dispense: 90 tablet; Refill: 3  -     Lipid panel; Future; Expected date: 08/24/2017    Other orders  -     sildenafil (VIAGRA) 100 MG tablet; Take 1 tablet (100 mg total) by mouth daily as needed for Erectile Dysfunction.  Dispense: 10 tablet; Refill: 3

## 2017-08-29 ENCOUNTER — TELEPHONE (OUTPATIENT)
Dept: INTERNAL MEDICINE | Facility: CLINIC | Age: 57
End: 2017-08-29

## 2017-08-29 NOTE — TELEPHONE ENCOUNTER
Spoke with patient and explained to him about his insurance requirements for the nasal spray. Informed patient to try Zyrtec OTC. Patient verbalized understanding

## 2017-08-29 NOTE — TELEPHONE ENCOUNTER
----- Message from Felisha Ruiz MD sent at 8/28/2017 11:26 AM CDT -----  Can try OTC Zyrtec 1 tab/daily.  TSA  ----- Message -----  From: Alycia Ibanez LPN  Sent: 8/28/2017   8:46 AM  To: Felisha Ruiz MD    Per patient's insurance Mometasone Furoate 50mcg spray is not covered. They would like patient to try Flunisolide, ipratropium Bromide or Cetirizine. Please advise

## 2017-09-13 LAB
ALBUMIN SERPL-MCNC: 4.3 G/DL (ref 3.6–5.1)
ALBUMIN/GLOB SERPL: 1.3 (CALC) (ref 1–2.5)
ALP SERPL-CCNC: 43 U/L (ref 40–115)
ALT SERPL-CCNC: 18 U/L (ref 9–46)
APPEARANCE UR: CLEAR
AST SERPL-CCNC: 25 U/L (ref 10–35)
BASOPHILS # BLD AUTO: 52 CELLS/UL (ref 0–200)
BASOPHILS NFR BLD AUTO: 0.8 %
BILIRUB SERPL-MCNC: 0.6 MG/DL (ref 0.2–1.2)
BILIRUB UR QL STRIP: NEGATIVE
BUN SERPL-MCNC: 20 MG/DL (ref 7–25)
BUN/CREAT SERPL: 14 (CALC) (ref 6–22)
CALCIUM SERPL-MCNC: 10 MG/DL (ref 8.6–10.3)
CHLORIDE SERPL-SCNC: 108 MMOL/L (ref 98–110)
CHOLEST SERPL-MCNC: 168 MG/DL
CHOLEST/HDLC SERPL: 4.7 (CALC)
CO2 SERPL-SCNC: 23 MMOL/L (ref 20–31)
COLOR UR: YELLOW
CREAT SERPL-MCNC: 1.43 MG/DL (ref 0.7–1.33)
EOSINOPHIL # BLD AUTO: 124 CELLS/UL (ref 15–500)
EOSINOPHIL NFR BLD AUTO: 1.9 %
ERYTHROCYTE [DISTWIDTH] IN BLOOD BY AUTOMATED COUNT: 12.6 % (ref 11–15)
GFR SERPL CREATININE-BSD FRML MDRD: 55 ML/MIN/1.73M2
GLOBULIN SER CALC-MCNC: 3.2 G/DL (CALC) (ref 1.9–3.7)
GLUCOSE SERPL-MCNC: 97 MG/DL (ref 65–99)
GLUCOSE UR QL STRIP: NEGATIVE
HCT VFR BLD AUTO: 45.7 % (ref 38.5–50)
HCV AB S/CO SERPL IA: 0.01
HCV AB SERPL QL IA: NORMAL
HDLC SERPL-MCNC: 36 MG/DL
HGB BLD-MCNC: 15.7 G/DL (ref 13.2–17.1)
HGB UR QL STRIP: NEGATIVE
HIV1 RNA # SERPL NAA+PROBE: ABNORMAL COPIES/ML
HIV1 RNA SERPL NAA+PROBE-LOG#: ABNORMAL LOG COPIES/ML
KETONES UR QL STRIP: NEGATIVE
LDLC SERPL CALC-MCNC: 107 MG/DL (CALC)
LEUKOCYTE ESTERASE UR QL STRIP: NEGATIVE
LYMPHOCYTES # BLD AUTO: 2431 CELLS/UL (ref 850–3900)
LYMPHOCYTES NFR BLD AUTO: 37.4 %
MCH RBC QN AUTO: 31.5 PG (ref 27–33)
MCHC RBC AUTO-ENTMCNC: 34.4 G/DL (ref 32–36)
MCV RBC AUTO: 91.6 FL (ref 80–100)
MONOCYTES # BLD AUTO: 390 CELLS/UL (ref 200–950)
MONOCYTES NFR BLD AUTO: 6 %
NEUTROPHILS # BLD AUTO: 3504 CELLS/UL (ref 1500–7800)
NEUTROPHILS NFR BLD AUTO: 53.9 %
NITRITE UR QL STRIP: NEGATIVE
NONHDLC SERPL-MCNC: 132 MG/DL (CALC)
PH UR STRIP: 6 [PH] (ref 5–8)
PLATELET # BLD AUTO: 375 THOUSAND/UL (ref 140–400)
PMV BLD REES-ECKER: 10.3 FL (ref 7.5–12.5)
POTASSIUM SERPL-SCNC: 5 MMOL/L (ref 3.5–5.3)
PROT SERPL-MCNC: 7.5 G/DL (ref 6.1–8.1)
PROT UR QL STRIP: NEGATIVE
RBC # BLD AUTO: 4.99 MILLION/UL (ref 4.2–5.8)
SODIUM SERPL-SCNC: 140 MMOL/L (ref 135–146)
SP GR UR STRIP: 1 (ref 1–1.03)
TRIGL SERPL-MCNC: 140 MG/DL
WBC # BLD AUTO: 6.5 THOUSAND/UL (ref 3.8–10.8)

## 2017-11-09 ENCOUNTER — TELEPHONE (OUTPATIENT)
Dept: INTERNAL MEDICINE | Facility: CLINIC | Age: 57
End: 2017-11-09

## 2017-11-09 NOTE — TELEPHONE ENCOUNTER
Pt requesting to speak with Dr. Ruiz on Tuesday in reference to getting a referral [place].

## 2017-11-09 NOTE — TELEPHONE ENCOUNTER
----- Message from Xin Moct[place]uma sent at 11/9/2017 11:59 AM CST -----  Contact: Patient   Pt is a requesting a referral. Pt can be reached at [phone]

## 2017-12-21 DIAGNOSIS — E78.2 HYPERLIPIDEMIA, MIXED: Chronic | ICD-10-CM

## 2017-12-21 RX ORDER — FENOFIBRATE 145 MG/1
145 TABLET, FILM COATED ORAL DAILY
Qty: 90 TABLET | Refills: 3 | Status: SHIPPED | OUTPATIENT
Start: 2017-12-21 | End: 2018-01-02 | Stop reason: SDUPTHER

## 2018-01-02 DIAGNOSIS — E78.2 HYPERLIPIDEMIA, MIXED: Chronic | ICD-10-CM

## 2018-01-02 DIAGNOSIS — B20 HIV DISEASE: ICD-10-CM

## 2018-01-02 RX ORDER — FENOFIBRATE 145 MG/1
145 TABLET, FILM COATED ORAL DAILY
Qty: 90 TABLET | Refills: 3 | Status: SHIPPED | OUTPATIENT
Start: 2018-01-02 | End: 2018-02-08 | Stop reason: SDUPTHER

## 2018-02-08 ENCOUNTER — OFFICE VISIT (OUTPATIENT)
Dept: INTERNAL MEDICINE | Facility: CLINIC | Age: 58
End: 2018-02-08
Payer: MEDICARE

## 2018-02-08 VITALS
RESPIRATION RATE: 16 BRPM | HEART RATE: 69 BPM | SYSTOLIC BLOOD PRESSURE: 122 MMHG | DIASTOLIC BLOOD PRESSURE: 84 MMHG | BODY MASS INDEX: 20.8 KG/M2 | HEIGHT: 66 IN | WEIGHT: 129.44 LBS | OXYGEN SATURATION: 98 %

## 2018-02-08 DIAGNOSIS — F40.9 INSOMNIA DUE TO ANXIETY AND FEAR: Chronic | ICD-10-CM

## 2018-02-08 DIAGNOSIS — E80.4 GILBERTS SYNDROME: Chronic | ICD-10-CM

## 2018-02-08 DIAGNOSIS — E78.2 HYPERLIPIDEMIA, MIXED: Chronic | ICD-10-CM

## 2018-02-08 DIAGNOSIS — F41.9 ANXIETY: ICD-10-CM

## 2018-02-08 DIAGNOSIS — F51.05 INSOMNIA DUE TO ANXIETY AND FEAR: Chronic | ICD-10-CM

## 2018-02-08 DIAGNOSIS — B20 HIV DISEASE: Primary | Chronic | ICD-10-CM

## 2018-02-08 PROCEDURE — 99999 PR PBB SHADOW E&M-EST. PATIENT-LVL III: CPT | Mod: PBBFAC,,, | Performed by: INTERNAL MEDICINE

## 2018-02-08 PROCEDURE — 99214 OFFICE O/P EST MOD 30 MIN: CPT | Mod: S$PBB,,, | Performed by: INTERNAL MEDICINE

## 2018-02-08 PROCEDURE — 99213 OFFICE O/P EST LOW 20 MIN: CPT | Mod: PBBFAC,PN | Performed by: INTERNAL MEDICINE

## 2018-02-08 RX ORDER — FENOFIBRATE 145 MG/1
145 TABLET, FILM COATED ORAL DAILY
Qty: 90 TABLET | Refills: 3 | Status: SHIPPED | OUTPATIENT
Start: 2018-02-08 | End: 2018-07-18 | Stop reason: SDUPTHER

## 2018-02-08 RX ORDER — CLONAZEPAM 1 MG/1
1 TABLET ORAL DAILY
Qty: 30 TABLET | Refills: 5 | Status: SHIPPED | OUTPATIENT
Start: 2018-02-08 | End: 2018-07-18 | Stop reason: SDUPTHER

## 2018-02-08 RX ORDER — OMEGA-3-ACID ETHYL ESTERS 1 G/1
1 CAPSULE, LIQUID FILLED ORAL 2 TIMES DAILY
Qty: 180 CAPSULE | Refills: 3 | Status: SHIPPED | OUTPATIENT
Start: 2018-02-08 | End: 2018-07-18 | Stop reason: SDUPTHER

## 2018-02-08 RX ORDER — CLOBETASOL PROPIONATE 0.05 G/100ML
SHAMPOO TOPICAL
Refills: 3 | COMMUNITY
Start: 2018-02-02 | End: 2019-04-10

## 2018-02-08 RX ORDER — PRAVASTATIN SODIUM 20 MG/1
20 TABLET ORAL NIGHTLY
Qty: 90 TABLET | Refills: 3 | Status: SHIPPED | OUTPATIENT
Start: 2018-02-08 | End: 2018-07-18 | Stop reason: SDUPTHER

## 2018-02-08 RX ORDER — LORAZEPAM 2 MG/1
2 TABLET ORAL NIGHTLY
Qty: 30 TABLET | Refills: 5 | Status: SHIPPED | OUTPATIENT
Start: 2018-02-08 | End: 2018-07-18 | Stop reason: SDUPTHER

## 2018-02-08 NOTE — PROGRESS NOTES
"Subjective:      Patient ID: Clint England is a 57 y.o. male.    Chief Complaint: Medication Refill    HPI: 57 y.o. White male, here to follow up on:  HIV, he has been undetecteble for years, most recently unable to get CD4 from [facility].  Anxiety/insomnia, have been his predominant issues for decades, and on present regimen stable.  -bladder Ca, was treated By Dr. Forte, then upon his detention, currently with another doctor.  -Gilbert's  -Chron's  -Self image concerns.        Review of Systems   Constitutional: Negative.    HENT: Negative.    Eyes: Negative.    Respiratory: Negative.    Cardiovascular: Negative.    Gastrointestinal: Negative.    Endocrine: Negative.    Genitourinary: Negative.    Musculoskeletal: Negative.    Skin: Negative.    Allergic/Immunologic: Negative.    Neurological: Positive for headaches.   Hematological: Negative.    Psychiatric/Behavioral: Positive for sleep disturbance. The patient is nervous/anxious.        Objective:   /84 (BP Location: Right arm, Patient Position: Sitting, BP Method: Medium (Manual))   Pulse 69   Resp 16   Ht 5' 6" (1.676 m)   Wt 58.7 kg (129 lb 6.6 oz)   SpO2 98%   BMI 20.89 kg/m²     Physical Exam   Constitutional: He is oriented to person, place, and time. Vital signs are normal. He appears well-developed and well-nourished. He is sleeping, active and cooperative.  Non-toxic appearance. He does not have a sickly appearance. He does not appear ill.   HENT:   Head: Normocephalic and atraumatic. Not microcephalic. Head is without raccoon's eyes, without Bravo's sign, without abrasion, without contusion, without laceration, without right periorbital erythema and without left periorbital erythema. Hair is normal.   Right Ear: Hearing, tympanic membrane, external ear and ear canal normal.   Left Ear: Hearing, tympanic membrane, external ear and ear canal normal.   Nose: Nose normal.   Mouth/Throat: Uvula is midline and oropharynx is clear and moist. "   Eyes: Conjunctivae, EOM and lids are normal. Pupils are equal, round, and reactive to light.   Neck: Trachea normal, normal range of motion, full passive range of motion without pain and phonation normal. Neck supple. Normal carotid pulses, no hepatojugular reflux and no JVD present. Carotid bruit is not present. No Brudzinski's sign and no Kernig's sign noted. No thyroid mass present.   Cardiovascular: Normal rate, regular rhythm, S1 normal, S2 normal and normal heart sounds.   No extrasystoles are present. PMI is not displaced.    Pulmonary/Chest: Effort normal and breath sounds normal.   Abdominal: Soft. Normal appearance and bowel sounds are normal. There is no hepatosplenomegaly. There is no tenderness.   Genitourinary: Testes normal. Cremasteric reflex is present.   Musculoskeletal: Normal range of motion.   Lymphadenopathy:        Head (right side): No submental, no submandibular, no tonsillar, no preauricular, no posterior auricular and no occipital adenopathy present.        Head (left side): No submental, no submandibular, no tonsillar, no preauricular, no posterior auricular and no occipital adenopathy present.     He has no cervical adenopathy.     He has no axillary adenopathy.        Right: No inguinal, no supraclavicular and no epitrochlear adenopathy present.        Left: No inguinal, no supraclavicular and no epitrochlear adenopathy present.   Neurological: He is alert and oriented to person, place, and time. He has normal strength and normal reflexes. No sensory deficit. He displays a negative Romberg sign.   Reflex Scores:       Tricep reflexes are 2+ on the right side and 2+ on the left side.       Bicep reflexes are 2+ on the right side and 2+ on the left side.       Brachioradialis reflexes are 2+ on the right side and 2+ on the left side.       Patellar reflexes are 2+ on the right side and 2+ on the left side.       Achilles reflexes are 2+ on the right side and 2+ on the left side.  Skin:  Skin is warm and intact. No abrasion, no bruising, no burn, no ecchymosis, no laceration, no lesion, no petechiae and no purpura noted. Rash is not macular, not papular, not maculopapular, not nodular, not pustular, not vesicular and not urticarial. No cyanosis. Nails show no clubbing.   Psychiatric: He has a normal mood and affect. His speech is normal and behavior is normal. Judgment and thought content normal. He is not actively hallucinating. Cognition and memory are normal.   Nursing note and vitals reviewed.      Assessment:     1. HIV disease    2. Insomnia due to anxiety and fear    3. Anxiety    4. Hyperlipidemia, mixed    5. Colonia syndrome    So far all stable.  Plan:     HIV disease  -     Lymphocyte Subset Panel 5 T-Allen/Inducer; Future; Expected date: 02/08/2018  -     CBC auto differential; Future; Expected date: 02/08/2018  -     emtricita-rilpivirine-tenof DF (COMPLERA) 200- mg Tab; Take 1 tablet by mouth once daily.  Dispense: 90 tablet; Refill: 3  -     raltegravir (ISENTRESS) 400 mg tablet; Take 1 tablet (400 mg total) by mouth 2 (two) times daily.  Dispense: 180 tablet; Refill: 3  -     HIV RNA, quantitative, PCR; Future; Expected date: 02/08/2018    Insomnia due to anxiety and fear  -     LORazepam (ATIVAN) 2 MG Tab; Take 1 tablet (2 mg total) by mouth every evening.  Dispense: 30 tablet; Refill: 5    Anxiety  -     clonazePAM (KLONOPIN) 1 MG tablet; Take 1 tablet (1 mg total) by mouth once daily.  Dispense: 30 tablet; Refill: 5    Hyperlipidemia, mixed  -     Lipid panel; Future; Expected date: 02/08/2018  -     pravastatin (PRAVACHOL) 20 MG tablet; Take 1 tablet (20 mg total) by mouth nightly.  Dispense: 90 tablet; Refill: 3  -     fenofibrate (TRICOR) 145 MG tablet; Take 1 tablet (145 mg total) by mouth once daily.  Dispense: 90 tablet; Refill: 3  -     omega-3 acid ethyl esters (LOVAZA) 1 gram capsule; Take 1 capsule (1 g total) by mouth 2 (two) times daily.  Dispense: 180  capsule; Refill: 3    Coalton syndrome  -     Comprehensive metabolic panel; Future; Expected date: 02/08/2018  -     Urinalysis; Future; Expected date: 02/08/2018

## 2018-02-26 ENCOUNTER — OFFICE VISIT (OUTPATIENT)
Dept: URGENT CARE | Facility: CLINIC | Age: 58
End: 2018-02-26
Payer: MEDICARE

## 2018-02-26 VITALS
WEIGHT: 129 LBS | TEMPERATURE: 97 F | HEIGHT: 66 IN | SYSTOLIC BLOOD PRESSURE: 127 MMHG | DIASTOLIC BLOOD PRESSURE: 84 MMHG | BODY MASS INDEX: 20.73 KG/M2 | RESPIRATION RATE: 15 BRPM | OXYGEN SATURATION: 98 % | HEART RATE: 62 BPM

## 2018-02-26 DIAGNOSIS — M25.521 RIGHT ELBOW PAIN: Primary | ICD-10-CM

## 2018-02-26 DIAGNOSIS — M54.10 RADICULOPATHY AFFECTING UPPER EXTREMITY: ICD-10-CM

## 2018-02-26 PROCEDURE — 99214 OFFICE O/P EST MOD 30 MIN: CPT | Mod: S$GLB,,, | Performed by: FAMILY MEDICINE

## 2018-02-26 RX ORDER — NAPROXEN SODIUM 220 MG
220 TABLET ORAL
COMMUNITY
End: 2018-02-26

## 2018-02-26 RX ORDER — NAPROXEN 500 MG/1
500 TABLET ORAL 2 TIMES DAILY WITH MEALS
Qty: 10 TABLET | Refills: 0 | Status: SHIPPED | OUTPATIENT
Start: 2018-02-26 | End: 2018-03-03

## 2018-02-26 NOTE — PROGRESS NOTES
Subjective:       Patient ID: Clint England is a 57 y.o. male.    Vitals:  temperature is 97.2 °F (36.2 °C). His blood pressure is 127/84 and his pulse is 62. His respiration is 15 and oxygen saturation is 98%.     Chief Complaint: Joint Swelling    Pt in for right elbow pain. Radiates upa nd down the arm. Hurts worse at night. Throbbing pain. Pt symptoms started 6 weeks ago and have gotten worse since. Denies any trauma. Been taking aleve for the pain. Denies icing it. Denies hx of this happening before. Pt is from [place]. Pt is on disability.       Elbow Injury   This is a new problem. The current episode started more than 1 month ago. Associated symptoms include joint swelling and numbness. Pertinent negatives include no abdominal pain, arthralgias, chest pain, chills, coughing, fever, headaches, nausea, rash, sore throat, swollen glands, visual change or vomiting. He has tried NSAIDs for the symptoms.     Review of Systems   Constitution: Negative for chills and fever.   HENT: Negative for sore throat.    Eyes: Negative for blurred vision.   Cardiovascular: Negative for chest pain.   Respiratory: Negative for cough and shortness of breath.    Skin: Negative for rash.   Musculoskeletal: Positive for joint swelling. Negative for arthralgias, back pain and joint pain.   Gastrointestinal: Negative for abdominal pain, diarrhea, nausea and vomiting.   Neurological: Positive for numbness. Negative for headaches.   Psychiatric/Behavioral: The patient is not nervous/anxious.        Objective:      Physical Exam   Constitutional: He is oriented to person, place, and time. He appears well-developed and well-nourished. He is cooperative.  Non-toxic appearance. He does not appear ill. No distress.   HENT:   Head: Normocephalic and atraumatic.   Right Ear: Hearing, tympanic membrane, external ear and ear canal normal.   Left Ear: Hearing, tympanic membrane, external ear and ear canal normal.   Nose: Nose normal. No mucosal  edema, rhinorrhea or nasal deformity. No epistaxis. Right sinus exhibits no maxillary sinus tenderness and no frontal sinus tenderness. Left sinus exhibits no maxillary sinus tenderness and no frontal sinus tenderness.   Mouth/Throat: Uvula is midline, oropharynx is clear and moist and mucous membranes are normal. No trismus in the jaw. Normal dentition. No uvula swelling. No posterior oropharyngeal erythema.   Eyes: Conjunctivae, EOM and lids are normal. Pupils are equal, round, and reactive to light. Right eye exhibits no discharge. Left eye exhibits no discharge. No scleral icterus.   Sclera clear bilat   Neck: Trachea normal, normal range of motion, full passive range of motion without pain and phonation normal. Neck supple.   Cardiovascular: Normal rate, regular rhythm, normal heart sounds, intact distal pulses and normal pulses.    Pulmonary/Chest: Effort normal and breath sounds normal. No respiratory distress.   Abdominal: Soft. Normal appearance and bowel sounds are normal. He exhibits no distension, no pulsatile midline mass and no mass. There is no tenderness.   Musculoskeletal: Normal range of motion. He exhibits no edema or deformity.        Right elbow: He exhibits normal range of motion, no swelling, no effusion, no deformity and no laceration. Tenderness found. Lateral epicondyle tenderness noted. No radial head, no medial epicondyle and no olecranon process tenderness noted.        Arms:  Elbow pain noted over the lateral epicondyle.  No swelling, redness or warmth to the area.  There is no obvious deformity or crepitus. Patient has FULL ROM during exam.    Neurological: He is alert and oriented to person, place, and time. He exhibits normal muscle tone. Coordination normal.   Skin: Skin is warm, dry and intact. He is not diaphoretic. No pallor.   Psychiatric: He has a normal mood and affect. His speech is normal and behavior is normal. Judgment and thought content normal. Cognition and memory are  normal.   Nursing note and vitals reviewed.      Assessment:       1. Right elbow pain    2. Radiculopathy affecting upper extremity        Plan:         Right elbow pain  -     Ambulatory Referral to Orthopedics    Radiculopathy affecting upper extremity      Patient Instructions   General Referral to [facility]  You were referred to [facility] to Establish Care and Management of your condition.    Your appointment is scheduled for:  March 7, 2018 at 2:00pm   Orthopedics - 5th Floor  [address] [place]  [address]    Please call [phone] to reschedule your appointment.    Please return here or go to the Emergency Department for any concerns or worsening of condition.  Please follow up with your primary care doctor or specialist in the next 48-72hrs as needed.    If you  smoke, please stop smoking.                                                               Ortho injury  If your condition worsens or fails to improve we recommend that you receive another evaluation at the ER immediately or contact your PCP to discuss your concerns or return here. You must understand that you've received an urgent care treatment only and that you may be released before all your medical problems are known or treated. You the patient will arrange for follouwp care as instructed.   If you were prescribed a narcotic or muscle relaxant do not drive or operate heavy machinery while taking these medications   Tylenol or ibuprofen can also be used as directed for pain unless you have an allergy to them or medical condition such as stomach ulcers, kidney or liver disease or blood thinners etc for which you should not be taking these type of medications.   If you were given a prescription NSAID here do not also take any over the counter NSAID like ibuprofen, aleve, advil, motrin etc   RICE which means rest, ice compression and elevation are helpful.

## 2018-02-26 NOTE — PATIENT INSTRUCTIONS
General Referral to [facility]  You were referred to [facility] to Establish Care and Management of your condition.    Your appointment is scheduled for:  March 7, 2018 at 2:00pm   Orthopedics - 5th Floor  [address] [place]  [address]    Please call [phone] to reschedule your appointment.    Please return here or go to the Emergency Department for any concerns or worsening of condition.  Please follow up with your primary care doctor or specialist in the next 48-72hrs as needed.    If you  smoke, please stop smoking.                                                               Ortho injury  If your condition worsens or fails to improve we recommend that you receive another evaluation at the ER immediately or contact your PCP to discuss your concerns or return here. You must understand that you've received an urgent care treatment only and that you may be released before all your medical problems are known or treated. You the patient will arrange for follouwp care as instructed.   If you were prescribed a narcotic or muscle relaxant do not drive or operate heavy machinery while taking these medications   Tylenol or ibuprofen can also be used as directed for pain unless you have an allergy to them or medical condition such as stomach ulcers, kidney or liver disease or blood thinners etc for which you should not be taking these type of medications.   If you were given a prescription NSAID here do not also take any over the counter NSAID like ibuprofen, aleve, advil, motrin etc   RICE which means rest, ice compression and elevation are helpful.       ACE Wrap  Minor muscle or joint injuries are often treated with an elastic bandage. The bandage provides support and compression to the injured area. An elastic bandage is a stretchy, rolled bandage. Elastic bandages range in width from 2 to 6 inches. They can be used for a variety of injuries. The bandages are often called ACE  bandages, after the most common brand name.  If used correctly, elastic bandages help control swelling and ease pain. An elastic bandage is also a good reminder not to overuse the injured area. However, elastic bandages do not provide a lot of support and will not prevent reinjury.  Home care    To apply an elastic bandage:  · Check the skin before wrapping the injury. It should be clean, dry, and free of drainage.  · Start wrapping below the injury and work your way toward the body. For an ankle sprain, start wrapping around the foot and work up toward the calf. This will help control swelling.  · Overlap the edges of the bandage so it stays snuggly in place.  · Wrap the bandage firmly, but not too tightly. A tight bandage can increase swelling on either end of the bandage. Make sure the bandage is wrinkle free.  · Leave fingers and toes exposed.  · Secure ends of the bandage (even self-sticking ones) with clips or tape.  · Check frequently to ensure adequate circulation, especially in the fingers and toes. Loosen the bandage if there is local swelling, numbness, tingling, discomfort, coldness, or discoloration (skin pale or bluish in color).  · Rewrap the bandage as needed during the day. Reroll the bandage as you unwind it.  Continue using the elastic bandage until the pain and swelling are gone or as your healthcare provider advises.  If you have been told to ice the area, the ice can be secured in place with the elastic bandage. Wrap the ice pack with a thin towel to protect the skin. Do not put ice or an ice pack directly on the skin.  Ice the area for no more than 20 minutes at a time.    Follow-up care  Follow up with your healthcare provider, as advised.  When to seek medical advice  Call your healthcare provider for any of the following:  · Pain and swelling that doesn't get better or gets worse  · Trouble moving injured area  · Skin discoloration, numbness, or tingling that doesnt go away after bandage  is removed  Date Last Reviewed: 9/13/2015  © 1315-1043 [employer]. [address] [place] [address], [place] [address]. All rights reserved. This information is not intended as a substitute for professional medical care. Always follow your healthcare professional's instructions.        R.I.C.E.    R.I.C.E. stands for Rest, Ice, Compression, and Elevation. Doing these things helps limit pain and swelling after an injury. R.I.C.E. also helps injuries heal faster. Use R.I.C.E. for sprains, strains, and severe bruises or bumps. Follow the tips on this handout and begin R.I.C.E. as soon as possible after an injury.  ? Rest  Pain is your bodys way of telling you to rest an injured area. Whether you have hurt an elbow, hand, foot, or knee, limiting its use will prevent further injury and help you heal.  ? Ice  Applying ice right after an injury helps prevent swelling and reduce pain. Dont place ice directly on your skin.  · Wrap a cold pack or bag of ice in a thin cloth. Place it over the injured area.  · Ice for 10 minutes every 3 hours. Dont ice for more than 20 minutes at a time.  ? Compression  Putting pressure (compression) on an injury helps prevent swelling and provides support.  · Wrap the injured area firmly with an elastic bandage. If your hand or foot tingles, becomes discolored, or feels cold to the touch, the bandage may be too tight. Rewrap it more loosely.  · If your bandage becomes too loose, rewrap it.  · Do not wear an elastic bandage overnight.  ? Elevation  Keeping an injury elevated helps reduce swelling, pain, and throbbing. Elevation is most effective when the injury is kept elevated higher than the heart.     Call your healthcare provider if you notice any of the following:  · Fingers or toes feel numb, are cold to the touch, or change color  · Skin looks shiny or tight  · Pain, swelling, or bruising worsens and is not improved with elevation   Date Last Reviewed: 9/3/2015  © 0069-9957 [employer]. [address] [place] [address], [place] [address]. All rights reserved. This information is not intended as a substitute for professional medical care. Always follow your healthcare professional's instructions.        Elbow Sprain    A sprain is a tearing of the ligaments that hold a joint together. This may take up to 6 weeks to fully heal, depending on how severe it is. Moderate to severe sprains are treated with a sling or splint. Minor sprains can be treated without any special support.  Home care  The following guidelines will help you care for your injury at home:  · Keep your arm elevated to reduce pain and swelling. When sitting or lying down keep your arm above the level of your heart. You can do this by placing your arm on a pillow that rests on your chest or on a pillow at your side. This is most important during the first 2 days (48 hours) after injury.  · Put an ice pack on the injured area. Do this for 20 minutes every 1 to 2 hours the first day. You can make an ice pack by wrapping a plastic bag of ice cubes in a thin towel. As the ice melts, be careful that the splint doesnt get wet. Continue using the ice pack 3 to 4 times a day for the next 2 days. Then use the ice pack as needed to ease pain and swelling.  · If you were given a plaster or fiberglass splint, leave it on as advised, or until you see your healthcare provider. Keep it dry at all times. Bathe with your splint out of the water. Protect it with a large plastic bag, rubber-banded at the top end. If a fiberglass splint gets wet, you can dry it with a hair dryer. Once the splint is removed, move your elbow through its full range of motion several times a day. This will prevent stiffness.  · If you were given a sling only, begin gradual range-of-motion exercises after the first few days, unless told otherwise. This will prevent stiffness in the elbow. Stop wearing the sling once the pain is better.  · You may use  acetaminophen or ibuprofen to control pain, unless another pain medicine was prescribed. If you have chronic liver or kidney disease, talk with your healthcare provider before using these medicines. Also talk with your provider if youve had a stomach ulcer or gastrointestinal bleeding.  Follow-up care  Follow up with your doctor as directed.  Any X-rays you had today dont show any broken bones, breaks, or fractures. Sometimes fractures dont show up on the first X-ray. Bruises and sprains can sometimes hurt as much as a fracture. These injuries can take time to heal completely. If your symptoms dont improve or they get worse, talk with your healthcare provider. You may need a repeat X-ray or other tests.  When to seek medical advice  Call your healthcare provider right away  if any of these occur:  · The plaster splint becomes wet or soft  · The fiberglass splint remains wet for more than 24 hours  · Bad odor from the splint or wound fluid stains the splint  · Splint cracks  · Tightness or pain in the elbow gets worse  · Fingers become swollen, cold, blue, numb, or tingly  · You are less able to move the elbow, hand or fingers  · Area around splint becomes red, swollen, or irritated  · Fever of 101ºF (38.3ºC) or higher, or as directed by your healthcare provider  Date Last Reviewed: 5/1/2017  © 5354-3983 The [employer] [address] [place] [address], [place] [address]. All rights reserved. This information is not intended as a substitute for professional medical care. Always follow your healthcare professional's instructions.

## 2018-02-26 NOTE — PROGRESS NOTES
Patient and friend in exam room for the visit. Patient describes pain as intense at night with shooting pain from his right elbow to his right shoulder. States that sometimes at night, the elbow is red, hot and swollen which keeps him from sleeping at times.  Denies fall or injury to the area.  However, patient reports that he does repetitive movements such as painting and pulling weeds in the garden.  Expressed concern about arthritis or bone cancer with his previous history of kidney cancer.  States that the cancer was contained to the kidney and he follows up with someone at [facility] every 6 months for monitoring.  Advised patient that I could not rule out this but he would need further testing such as labs and imaging to rule this diagnosis out.  Also advised patient to raise this concern during his visit with Orthopedics next week so that it can be addressed appropriately.    Discussed other differential diagnosis with patient such as CTS, DeQuervain's, Tenosynovitis, Elbow sprain, Gout, Arthritis but could not rule out without more thorough testing.  Contacted the  desk to schedule an appointment with Ortho - scheduled for next week on March 7, 2018 (see chart)  Patient requested to have uric acid levels drawn next week to rule out Gout.  I informed the patient that I would note this in his chart and my recommendations and notes would be available to the orthopedist during his visit. Patient given a prescription for Naprosyn for pain and swelling.  I wrapped his elbow in the exam room while reviewed discharge instructions with patient.  Reiterated the RICE principle during this time and until he visits the orthopedist.  Patient advised to decrease work around the garden and painting as it may aggravate the current condition.  Patient verbalized understanding of all discussed during visit while agrees with plan of care.

## 2018-03-07 ENCOUNTER — HOSPITAL ENCOUNTER (OUTPATIENT)
Dept: RADIOLOGY | Facility: HOSPITAL | Age: 58
Discharge: HOME | End: 2018-03-07
Attending: PHYSICIAN ASSISTANT
Payer: MEDICARE

## 2018-03-07 ENCOUNTER — OFFICE VISIT (OUTPATIENT)
Dept: ORTHOPEDICS | Facility: CLINIC | Age: 58
End: 2018-03-07
Payer: MEDICARE

## 2018-03-07 VITALS
SYSTOLIC BLOOD PRESSURE: 110 MMHG | WEIGHT: 133.38 LBS | HEART RATE: 68 BPM | HEIGHT: 66 IN | DIASTOLIC BLOOD PRESSURE: 78 MMHG | BODY MASS INDEX: 21.44 KG/M2

## 2018-03-07 DIAGNOSIS — R52 PAIN: ICD-10-CM

## 2018-03-07 DIAGNOSIS — M25.522 PAIN AND SWELLING OF LEFT ELBOW: ICD-10-CM

## 2018-03-07 DIAGNOSIS — R52 PAIN: Primary | ICD-10-CM

## 2018-03-07 DIAGNOSIS — M25.422 PAIN AND SWELLING OF LEFT ELBOW: ICD-10-CM

## 2018-03-07 DIAGNOSIS — G56.02 CARPAL TUNNEL SYNDROME OF LEFT WRIST: ICD-10-CM

## 2018-03-07 DIAGNOSIS — M48.02 SPINAL STENOSIS OF CERVICAL REGION: ICD-10-CM

## 2018-03-07 DIAGNOSIS — M79.602 PAIN OF LEFT UPPER EXTREMITY: ICD-10-CM

## 2018-03-07 PROCEDURE — 73080 X-RAY EXAM OF ELBOW: CPT | Mod: 26,RT,, | Performed by: RADIOLOGY

## 2018-03-07 PROCEDURE — 99214 OFFICE O/P EST MOD 30 MIN: CPT | Mod: PBBFAC,25 | Performed by: PHYSICIAN ASSISTANT

## 2018-03-07 PROCEDURE — 72040 X-RAY EXAM NECK SPINE 2-3 VW: CPT | Mod: TC

## 2018-03-07 PROCEDURE — 99203 OFFICE O/P NEW LOW 30 MIN: CPT | Mod: S$PBB,,, | Performed by: PHYSICIAN ASSISTANT

## 2018-03-07 PROCEDURE — 72040 X-RAY EXAM NECK SPINE 2-3 VW: CPT | Mod: 26,,, | Performed by: RADIOLOGY

## 2018-03-07 PROCEDURE — 99999 PR PBB SHADOW E&M-EST. PATIENT-LVL IV: CPT | Mod: PBBFAC,,, | Performed by: PHYSICIAN ASSISTANT

## 2018-03-07 PROCEDURE — 73080 X-RAY EXAM OF ELBOW: CPT | Mod: TC,RT

## 2018-03-07 RX ORDER — TRAMADOL HYDROCHLORIDE 50 MG/1
50 TABLET ORAL EVERY 6 HOURS PRN
Qty: 25 TABLET | Refills: 0 | Status: SHIPPED | OUTPATIENT
Start: 2018-03-07 | End: 2018-03-17

## 2018-03-07 NOTE — PROGRESS NOTES
SUBJECTIVE:     Chief Complaint & History of Present Illness:  Clint England is a  New  patient 57 y.o. male who is seen here today with a complaint of    Chief Complaint   Patient presents with    Right Elbow - Pain    Left Shoulder - Pain    .  Since her today for evaluation treatment of bilateral upper extremity pain for the past 2 months.  Has any history of trauma or injury was seen by urgent care 2/26/2018.  At time there was much concern for redness and swelling of the right elbow.  Since that time the symptoms have a essentially resolved he is not having pain in bilateral shoulders radiating down the arms left more so than right with complaints of intermittent swelling and paresthesias in the left hand and wrist region.  States symptoms are exacerbated with lying down relieved with standing denies any back pain and tenderness or weakness to the upper extremities.  Patient is very difficult to keep on to have scant description of symptoms and problems consistently changes throughout the course of the interview.  Patient is HIV positive but reports not consistently taking his medicine and has been very inconsistent with the follow-up on lab testing.  Most recent HIV RNA quantitative PCR was 8/24/17 which was positive  On a scale of 1-10, with 10 being worst pain imaginable, he rates this pain as 3 on good days and 6 on bad days.  he describes the pain as sore and tingling.    Review of patient's allergies indicates:  No Known Allergies      Current Outpatient Prescriptions   Medication Sig Dispense Refill    azelastine (ASTELIN) 137 mcg (0.1 %) nasal spray 1 spray (137 mcg total) by Nasal route 2 (two) times daily. 30 mL 5    BETAHISTINE HCL (BETAHISTINE, BULK, MISC) Take 1 capsule by mouth 3 (three) times daily.      clobetasol (CLOBEX) 0.05 % shampoo Apply to scalp in place OF REGULAR SHAMPOO  3    clonazePAM (KLONOPIN) 1 MG tablet Take 1 tablet (1 mg total) by mouth once daily. 30 tablet 5     emtricita-rilpivirine-tenof DF (COMPLERA) 200- mg Tab Take 1 tablet by mouth once daily. 90 tablet 3    fenofibrate (TRICOR) 145 MG tablet Take 1 tablet (145 mg total) by mouth once daily. 90 tablet 3    LORazepam (ATIVAN) 2 MG Tab Take 1 tablet (2 mg total) by mouth every evening. 30 tablet 5    mesalamine (LIALDA) 1.2 gram TbEC Take 1.2 g by mouth 2 (two) times daily.      mometasone (NASONEX) 50 mcg/actuation nasal spray 2 sprays by Nasal route once daily. 17 g 3    omega-3 acid ethyl esters (LOVAZA) 1 gram capsule Take 1 capsule (1 g total) by mouth 2 (two) times daily. 180 capsule 3    pravastatin (PRAVACHOL) 20 MG tablet Take 1 tablet (20 mg total) by mouth nightly. 90 tablet 3    raltegravir (ISENTRESS) 400 mg tablet Take 1 tablet (400 mg total) by mouth 2 (two) times daily. 180 tablet 3    sildenafil (VIAGRA) 100 MG tablet Take 1 tablet (100 mg total) by mouth daily as needed for Erectile Dysfunction. 10 tablet 3    traMADol (ULTRAM) 50 mg tablet Take 1 tablet (50 mg total) by mouth every 6 (six) hours as needed for Pain. 25 tablet 0     No current facility-administered medications for this visit.        Past Medical History:   Diagnosis Date    Allergy     Anxiety     Crohn's disease     Depression     Gilbert's syndrome     Gout     HIV infection     Hyperlipidemia     Sleep apnea        Past Surgical History:   Procedure Laterality Date    ADENOIDECTOMY      EYE SURGERY      HERNIA REPAIR Left     KNEE SURGERY Left        Vital Signs (Most Recent)  Vitals:    03/07/18 1406   BP: 110/78   Pulse: 68       Review of Systems:  ROS:  Constitutional: no fever or chills  Eyes: no visual changes  ENT: no nasal congestion or sore throat  Respiratory: no cough or shortness of breath  Cardiovascular: no chest pain or palpitations  Gastrointestinal: no nausea or vomiting, tolerating diet, Antwan for Crohn's disease, Gilbertt's syndrome  Genitourinary: no hematuria or  "dysuria  Integument/Breast: no rash or pruritis  Hematologic/Lymphatic: no easy bruising or lymphadenopathy, Eyes he for HIV disease  Musculoskeletal: no arthralgias or myalgias, Positive for gout  Neurological: no seizures or tremors, Positive polyneuropathy  Behavioral/Psych: no auditory or visual hallucinations, Positive for depression and anxiety and insomnia  Endocrine: no heat or cold intolerance      OBJECTIVE:     PHYSICAL EXAM:  Height: 5' 6" (167.6 cm) Weight: 60.5 kg (133 lb 6.1 oz), General Appearance: Well nourished, well developed, in no acute distress.  Neurological: Mood & affect are normal.  Shoulder exam: bilateral  Tenderness: globally  ROM: forward flexion 180/180, extension 45/45, full abduction 180/180, abduction-glenohumeral 90/90, external rotation 50/50  Shoulder Strength: biceps 5/5, triceps 5/5, abduction 5/5, adduction 5/5, external rotation 5/5 with shoulder at side, flexion 5/5, and extension 5/5  full ROM, sensory exam normal, motor exam normal and radial pulse intact  Stability tests: normal  Negative Tinel positive Finkelstein on the left  Negative Cozen                     RADIOGRAPHS:  X-rays of cervical spine demonstrate no evidence of advanced degenerative disc disease mild the foraminal narrowing in the lower cervical spine regions.  X-ray of the right elbow demonstrates no evidence of fracture dislocation or advanced degenerative joint disease    ASSESSMENT/PLAN:     Plan: MRI cervical spine.  EMG nerve conduction study upper extremity  Patient advised to follow up with PCP for more current blood work and tighter management of his HIV  "

## 2018-06-14 ENCOUNTER — TELEPHONE (OUTPATIENT)
Dept: INTERNAL MEDICINE | Facility: CLINIC | Age: 58
End: 2018-06-14

## 2018-06-14 NOTE — TELEPHONE ENCOUNTER
----- Message from Cayla Hester sent at 6/14/2018  2:19 PM CDT -----  Contact: Self [phone]  Patient would like to speak with you about getting lab orders sent to [facility] on [address]. Please advise

## 2018-06-18 ENCOUNTER — TELEPHONE (OUTPATIENT)
Dept: INTERNAL MEDICINE | Facility: CLINIC | Age: 58
End: 2018-06-18

## 2018-06-18 NOTE — TELEPHONE ENCOUNTER
----- Message from Sayra Martin sent at 6/18/2018 10:40 AM CDT -----  Contact: patient   Patient said he has been requesting his labs to be faxed to [facility] on [address]. Please send them over and let patient know when done.

## 2018-06-28 ENCOUNTER — PATIENT MESSAGE (OUTPATIENT)
Dept: FAMILY MEDICINE | Facility: CLINIC | Age: 58
End: 2018-06-28

## 2018-06-29 LAB
ALBUMIN SERPL-MCNC: 4.6 G/DL (ref 3.6–5.1)
ALBUMIN/GLOB SERPL: 1.8 (CALC) (ref 1–2.5)
ALP SERPL-CCNC: 41 U/L (ref 40–115)
ALT SERPL-CCNC: 21 U/L (ref 9–46)
APPEARANCE UR: CLEAR
AST SERPL-CCNC: 23 U/L (ref 10–35)
BASOPHILS # BLD AUTO: 94 CELLS/UL (ref 0–200)
BASOPHILS NFR BLD AUTO: 1.1 %
BILIRUB SERPL-MCNC: 0.6 MG/DL (ref 0.2–1.2)
BILIRUB UR QL STRIP: NEGATIVE
BUN SERPL-MCNC: 21 MG/DL (ref 7–25)
BUN/CREAT SERPL: 13 (CALC) (ref 6–22)
CALCIUM SERPL-MCNC: 9.7 MG/DL (ref 8.6–10.3)
CD3+CD4+ CELLS # BLD: 845 CELLS/UL (ref 490–1740)
CD3+CD4+ CELLS NFR BLD: 34 % (ref 30–61)
CHLORIDE SERPL-SCNC: 107 MMOL/L (ref 98–110)
CHOLEST SERPL-MCNC: 160 MG/DL
CHOLEST/HDLC SERPL: 4.6 (CALC)
CO2 SERPL-SCNC: 22 MMOL/L (ref 20–31)
COLOR UR: YELLOW
CREAT SERPL-MCNC: 1.59 MG/DL (ref 0.7–1.33)
EOSINOPHIL # BLD AUTO: 162 CELLS/UL (ref 15–500)
EOSINOPHIL NFR BLD AUTO: 1.9 %
ERYTHROCYTE [DISTWIDTH] IN BLOOD BY AUTOMATED COUNT: 13.9 % (ref 11–15)
GFR SERPL CREATININE-BSD FRML MDRD: 48 ML/MIN/1.73M2
GLOBULIN SER CALC-MCNC: 2.5 G/DL (CALC) (ref 1.9–3.7)
GLUCOSE SERPL-MCNC: 90 MG/DL (ref 65–99)
GLUCOSE UR QL STRIP: ABNORMAL
HCT VFR BLD AUTO: 47.9 % (ref 38.5–50)
HDLC SERPL-MCNC: 35 MG/DL
HGB BLD-MCNC: 16.9 G/DL (ref 13.2–17.1)
HGB UR QL STRIP: NEGATIVE
HIV1 RNA # SERPL NAA+PROBE: 24 COPIES/ML
HIV1 RNA SERPL NAA+PROBE-LOG#: 1.38 LOG COPIES/ML
KETONES UR QL STRIP: NEGATIVE
LDLC SERPL CALC-MCNC: 98 MG/DL (CALC)
LEUKOCYTE ESTERASE UR QL STRIP: NEGATIVE
LYMPHOCYTES # BLD AUTO: 2457 CELLS/UL (ref 850–3900)
LYMPHOCYTES # BLD AUTO: 2510 CELLS/UL (ref 850–3900)
LYMPHOCYTES NFR BLD AUTO: 28.9 %
MCH RBC QN AUTO: 33 PG (ref 27–33)
MCHC RBC AUTO-ENTMCNC: 35.3 G/DL (ref 32–36)
MCV RBC AUTO: 93.6 FL (ref 80–100)
MONOCYTES # BLD AUTO: 638 CELLS/UL (ref 200–950)
MONOCYTES NFR BLD AUTO: 7.5 %
NEUTROPHILS # BLD AUTO: 5151 CELLS/UL (ref 1500–7800)
NEUTROPHILS NFR BLD AUTO: 60.6 %
NITRITE UR QL STRIP: NEGATIVE
NONHDLC SERPL-MCNC: 125 MG/DL (CALC)
PH UR STRIP: 6 [PH] (ref 5–8)
PLATELET # BLD AUTO: 340 THOUSAND/UL (ref 140–400)
PMV BLD REES-ECKER: 9.8 FL (ref 7.5–12.5)
POTASSIUM SERPL-SCNC: 4.7 MMOL/L (ref 3.5–5.3)
PROT SERPL-MCNC: 7.1 G/DL (ref 6.1–8.1)
PROT UR QL STRIP: ABNORMAL
RBC # BLD AUTO: 5.12 MILLION/UL (ref 4.2–5.8)
SODIUM SERPL-SCNC: 139 MMOL/L (ref 135–146)
SP GR UR STRIP: 1.01 (ref 1–1.03)
TRIGL SERPL-MCNC: 172 MG/DL
WBC # BLD AUTO: 8.5 THOUSAND/UL (ref 3.8–10.8)

## 2018-07-18 ENCOUNTER — OFFICE VISIT (OUTPATIENT)
Dept: INTERNAL MEDICINE | Facility: CLINIC | Age: 58
End: 2018-07-18
Payer: MEDICARE

## 2018-07-18 VITALS
DIASTOLIC BLOOD PRESSURE: 62 MMHG | OXYGEN SATURATION: 97 % | TEMPERATURE: 98 F | HEART RATE: 79 BPM | SYSTOLIC BLOOD PRESSURE: 118 MMHG | BODY MASS INDEX: 20.23 KG/M2 | WEIGHT: 125.88 LBS | HEIGHT: 66 IN | RESPIRATION RATE: 18 BRPM

## 2018-07-18 DIAGNOSIS — B20 HIV DISEASE: Primary | Chronic | ICD-10-CM

## 2018-07-18 DIAGNOSIS — N28.9 NEPHROPATHY: ICD-10-CM

## 2018-07-18 DIAGNOSIS — F51.05 INSOMNIA DUE TO ANXIETY AND FEAR: Chronic | ICD-10-CM

## 2018-07-18 DIAGNOSIS — F41.9 ANXIETY: ICD-10-CM

## 2018-07-18 DIAGNOSIS — E78.2 HYPERLIPIDEMIA, MIXED: Chronic | ICD-10-CM

## 2018-07-18 DIAGNOSIS — F40.9 INSOMNIA DUE TO ANXIETY AND FEAR: Chronic | ICD-10-CM

## 2018-07-18 DIAGNOSIS — R73.9 HYPERGLYCEMIA: ICD-10-CM

## 2018-07-18 PROCEDURE — 99999 PR PBB SHADOW E&M-EST. PATIENT-LVL III: CPT | Mod: PBBFAC,,, | Performed by: INTERNAL MEDICINE

## 2018-07-18 PROCEDURE — 99213 OFFICE O/P EST LOW 20 MIN: CPT | Mod: PBBFAC,PN | Performed by: INTERNAL MEDICINE

## 2018-07-18 PROCEDURE — 99213 OFFICE O/P EST LOW 20 MIN: CPT | Mod: S$PBB,,, | Performed by: INTERNAL MEDICINE

## 2018-07-18 RX ORDER — FINASTERIDE 1 MG/1
1 TABLET, FILM COATED ORAL DAILY
COMMUNITY

## 2018-07-18 RX ORDER — FENOFIBRATE 145 MG/1
145 TABLET, FILM COATED ORAL DAILY
Qty: 90 TABLET | Refills: 3 | Status: SHIPPED | OUTPATIENT
Start: 2018-07-18 | End: 2019-04-10 | Stop reason: SDUPTHER

## 2018-07-18 RX ORDER — LORAZEPAM 2 MG/1
2 TABLET ORAL NIGHTLY
Qty: 30 TABLET | Refills: 5 | Status: SHIPPED | OUTPATIENT
Start: 2018-07-18 | End: 2018-10-24 | Stop reason: SDUPTHER

## 2018-07-18 RX ORDER — CLONAZEPAM 1 MG/1
1 TABLET ORAL DAILY
Qty: 30 TABLET | Refills: 5 | Status: SHIPPED | OUTPATIENT
Start: 2018-07-18 | End: 2018-10-24 | Stop reason: SDUPTHER

## 2018-07-18 RX ORDER — OMEGA-3-ACID ETHYL ESTERS 1 G/1
1 CAPSULE, LIQUID FILLED ORAL 2 TIMES DAILY
Qty: 180 CAPSULE | Refills: 3 | Status: SHIPPED | OUTPATIENT
Start: 2018-07-18 | End: 2019-04-10 | Stop reason: SDUPTHER

## 2018-07-18 RX ORDER — PRAVASTATIN SODIUM 20 MG/1
20 TABLET ORAL NIGHTLY
Qty: 90 TABLET | Refills: 3 | Status: SHIPPED | OUTPATIENT
Start: 2018-07-18 | End: 2019-04-10 | Stop reason: SDUPTHER

## 2018-07-18 RX ORDER — MULTIVITAMIN
1 TABLET ORAL DAILY
COMMUNITY

## 2018-07-18 RX ORDER — SILDENAFIL 100 MG/1
100 TABLET, FILM COATED ORAL DAILY PRN
Qty: 10 TABLET | Refills: 3 | Status: SHIPPED | OUTPATIENT
Start: 2018-07-18 | End: 2018-10-24 | Stop reason: SDUPTHER

## 2018-07-18 NOTE — PROGRESS NOTES
Subjective:      Patient ID: Clint England is a 58 y.o. male.    Chief Complaint: Follow-up (4 month follow up); Results (lab results); and Medication Refill    HPI: 58 y.o. White male, with long standing HIV disease.  Major medical illnesses: Hyperlipidemia,insomnia,AMANDA,Gilbert's,Crohn's disease.  Most recent labs:     06/27/18 0935 HDL 35 Low Final result   06/27/18 0935 CHOL 160 - Final result   06/27/18 0935 TRIG 172 High Final result   06/27/18 0935 LDLCALC 98 - Final result   06/27/18 0935 CHOLHDL 4.6 - Final result   06/27/18 0935 NONHDLCHOL 125 - Final result   06/27/18 0935 COLORU YELLOW - Final result   06/27/18 0935 APPEARANCEUA CLEAR - Final result   06/27/18 0935 SPECGRAV 1.010 - Final result   06/27/18 0935 PHUR 6.0 - Final result   06/27/18 0935 BILIRUBINUR NEGATIVE - Final result   06/27/18 0935 KETONESU NEGATIVE - Final result   06/27/18 0935 OCCULTUA NEGATIVE - Final result   06/27/18 0935 NITRITE NEGATIVE - Final result   06/27/18 0935 LEUKOCYTESUR NEGATIVE - Final result   06/27/18 0935 WBC 8.5 - Final result   06/27/18 0935 RBC 5.12 - Final result   06/27/18 0935 HGB 16.9 - Final result   06/27/18 0935 HCT 47.9 - Final result   06/27/18 0935 MCH 33.0 - Final result   06/27/18 0935 RDW 13.9 - Final result   06/27/18 0935  - Final result   06/27/18 0935 MPV 9.8 - Final result   06/27/18 0935 GLU 90 - Final result   06/27/18 0935 BUN 21 - Final result   06/27/18 0935 CREATININE 1.59 High Final result   06/27/18 0935 CALCIUM 9.7 - Final result   06/27/18 0935  - Final result   06/27/18 0935 K 4.7 - Final result   06/27/18 0935  - Final result   06/27/18 0935 PROT 7.1 - Final result   06/27/18 0935 ALBUMIN 4.6 - Final result   06/27/18 0935 BILITOT 0.6 - Final result   06/27/18 0935 AST 23 - Final result   06/27/18 0935 ALKPHOS 41 - Final result   06/27/18 0935 CO2 22 - Final result   06/27/18 0935 ALT 21 - Final result   06/27/18 0935 EGFRNONAA 48 Low Final result   06/27/18  "0935 TREVORGFRAFJR 56 Low Final result   06/27/18 0935         CD4   835/  34%  HIV viral load  27    Both of these values are essentially where they have been for > 1 year.       Review of Systems   Constitutional: Negative.    HENT: Negative.    Eyes: Negative.    Respiratory: Negative for cough, choking, shortness of breath and wheezing.    Cardiovascular: Negative for chest pain, palpitations and leg swelling.   Gastrointestinal: Negative for abdominal distention, abdominal pain, constipation, diarrhea, nausea and vomiting.   Endocrine: Negative.    Genitourinary: Negative.    Musculoskeletal: Negative for back pain.   Skin:        Is taking an OTC supplement to make his hair grow   Allergic/Immunologic: Negative.    Neurological: Negative.    Hematological: Negative.    Psychiatric/Behavioral: Positive for agitation and sleep disturbance. The patient is nervous/anxious.        Objective:   /62 (BP Location: Left arm, Patient Position: Sitting, BP Method: Medium (Manual))   Pulse 79   Temp 98.4 °F (36.9 °C) (Oral)   Resp 18   Ht 5' 6" (1.676 m)   Wt 57.1 kg (125 lb 14.4 oz)   SpO2 97%   BMI 20.32 kg/m²     Physical Exam   Constitutional: He is oriented to person, place, and time. He appears well-developed and well-nourished.   HENT:   Head: Normocephalic and atraumatic.   Right Ear: External ear normal.   Left Ear: External ear normal.   Nose: Nose normal.   Mouth/Throat: Oropharynx is clear and moist.   Eyes: Conjunctivae and EOM are normal. Pupils are equal, round, and reactive to light.   Neck: Normal range of motion. Neck supple.   Cardiovascular: Normal rate, regular rhythm and normal heart sounds.    Pulmonary/Chest: Effort normal and breath sounds normal.   Abdominal: Soft. Bowel sounds are normal.   Musculoskeletal: Normal range of motion.   Neurological: He is alert and oriented to person, place, and time.   Skin: Skin is warm and dry.   Psychiatric: He has a normal mood and affect. His " behavior is normal. Judgment and thought content normal.   Nursing note and vitals reviewed.      Assessment:     1. HIV disease    2. Nephropathy    Will discontinue the complera, watch the renal function and consider an alternative for HAART,  Especially in view of tenofovir.  Plan:     HIV disease  -     Comprehensive metabolic panel; Future; Expected date: 07/18/2018    Nephropathy  -     Comprehensive metabolic panel; Future; Expected date: 07/18/2018

## 2018-08-14 ENCOUNTER — TELEPHONE (OUTPATIENT)
Dept: INTERNAL MEDICINE | Facility: CLINIC | Age: 58
End: 2018-08-14

## 2018-08-15 LAB
ALBUMIN SERPL-MCNC: 4.7 G/DL (ref 3.6–5.1)
ALBUMIN/GLOB SERPL: 1.8 (CALC) (ref 1–2.5)
ALP SERPL-CCNC: 48 U/L (ref 40–115)
ALT SERPL-CCNC: 19 U/L (ref 9–46)
AST SERPL-CCNC: 23 U/L (ref 10–35)
BILIRUB SERPL-MCNC: 0.7 MG/DL (ref 0.2–1.2)
BUN SERPL-MCNC: 20 MG/DL (ref 7–25)
BUN/CREAT SERPL: 14 (CALC) (ref 6–22)
CALCIUM SERPL-MCNC: 10.1 MG/DL (ref 8.6–10.3)
CHLORIDE SERPL-SCNC: 103 MMOL/L (ref 98–110)
CO2 SERPL-SCNC: 25 MMOL/L (ref 20–32)
CREAT SERPL-MCNC: 1.39 MG/DL (ref 0.7–1.33)
GFR SERPL CREATININE-BSD FRML MDRD: 56 ML/MIN/1.73M2
GLOBULIN SER CALC-MCNC: 2.6 G/DL (CALC) (ref 1.9–3.7)
GLUCOSE SERPL-MCNC: 142 MG/DL (ref 65–99)
POTASSIUM SERPL-SCNC: 4.2 MMOL/L (ref 3.5–5.3)
PROT SERPL-MCNC: 7.3 G/DL (ref 6.1–8.1)
SODIUM SERPL-SCNC: 137 MMOL/L (ref 135–146)

## 2018-08-17 ENCOUNTER — TELEPHONE (OUTPATIENT)
Dept: INTERNAL MEDICINE | Facility: CLINIC | Age: 58
End: 2018-08-17

## 2018-08-17 ENCOUNTER — OFFICE VISIT (OUTPATIENT)
Dept: INTERNAL MEDICINE | Facility: CLINIC | Age: 58
End: 2018-08-17
Payer: MEDICARE

## 2018-08-17 VITALS
HEART RATE: 80 BPM | WEIGHT: 125.44 LBS | SYSTOLIC BLOOD PRESSURE: 100 MMHG | RESPIRATION RATE: 18 BRPM | BODY MASS INDEX: 20.16 KG/M2 | DIASTOLIC BLOOD PRESSURE: 60 MMHG | OXYGEN SATURATION: 97 % | HEIGHT: 66 IN | TEMPERATURE: 99 F

## 2018-08-17 DIAGNOSIS — C67.9 MALIGNANT NEOPLASM OF URINARY BLADDER, UNSPECIFIED SITE: ICD-10-CM

## 2018-08-17 DIAGNOSIS — B20 HIV DISEASE: Primary | Chronic | ICD-10-CM

## 2018-08-17 PROCEDURE — 99213 OFFICE O/P EST LOW 20 MIN: CPT | Mod: S$PBB,,, | Performed by: INTERNAL MEDICINE

## 2018-08-17 PROCEDURE — 99213 OFFICE O/P EST LOW 20 MIN: CPT | Mod: PBBFAC,PN | Performed by: INTERNAL MEDICINE

## 2018-08-17 PROCEDURE — 99999 PR PBB SHADOW E&M-EST. PATIENT-LVL III: CPT | Mod: PBBFAC,,, | Performed by: INTERNAL MEDICINE

## 2018-08-17 RX ORDER — LAMIVUDINE AND ZIDOVUDINE 150; 300 MG/1; MG/1
1 TABLET, FILM COATED ORAL EVERY 12 HOURS
Qty: 60 TABLET | Refills: 11 | Status: SHIPPED | OUTPATIENT
Start: 2018-08-17 | End: 2018-09-20 | Stop reason: RX

## 2018-08-17 NOTE — TELEPHONE ENCOUNTER
----- Message from Josephine Godfrey sent at 8/17/2018 10:48 AM CDT -----  Contact: Self [phone]  Patient is running 20 minutes late to his appointment due to car problems. Please advice

## 2018-08-17 NOTE — PROGRESS NOTES
Subjective:      Patient ID: Clint England is a 58 y.o. male.    Chief Complaint: Follow-up (4 week follow up)    HPI: 58 y.o. White male      06/27/18 0935 HDL 35 Abnormal  Low Final result   06/27/18 0935 CHOL 160 - Final result   06/27/18 0935 TRIG 172 Abnormal  High Final result   06/27/18 0935 LDLCALC 98 - Final result   06/27/18 0935 CHOLHDL 4.6 - Final result   06/27/18 0935 NONHDLCHOL 125 - Final result   06/27/18 0935 COLORU YELLOW - Final result   06/27/18 0935 APPEARANCEUA CLEAR - Final result   06/27/18 0935 SPECGRAV 1.010 - Final result   06/27/18 0935 PHUR 6.0 - Final result   06/27/18 0935 BILIRUBINUR NEGATIVE - Final result   06/27/18 0935 KETONESU NEGATIVE - Final result   06/27/18 0935 OCCULTUA NEGATIVE - Final result   06/27/18 0935 NITRITE NEGATIVE - Final result   06/27/18 0935 LEUKOCYTESUR NEGATIVE - Final result   06/27/18 0935 WBC 8.5 - Final result   06/27/18 0935 RBC 5.12 - Final result   06/27/18 0935 HGB 16.9 - Final result   06/27/18 0935 HCT 47.9 - Final result   06/27/18 0935 MCH 33.0 - Final result   06/27/18 0935 RDW 13.9 - Final result   06/27/18 0935  - Final result   06/27/18 0935 MPV 9.8 - Final result   08/14/18 0939  Abnormal  High Final result   08/14/18 0939 BUN 20 - Final result   08/14/18 0939 CREATININE 1.39 Abnormal  High Final result   08/14/18 0939 CALCIUM 10.1 - Final result   08/14/18 0939  - Final result   08/14/18 0939 K 4.2 - Final result   08/14/18 0939  - Final result   08/14/18 0939 PROT 7.3 - Final result   08/14/18 0939 ALBUMIN 4.7 - Final result   08/14/18 0939 BILITOT 0.7 - Final result   08/14/18 0939 AST 23 - Final result   08/14/18 0939 ALKPHOS 48 - Final result   08/14/18 0939 CO2 25 - Final result   08/14/18 0939 ALT 19 - Final result   08/14/18 0939 EGFRNONAA 56 Abnormal  Low Final result   08/14/18 0939 ESTGFRAFRICA 65 - Final result   06/27/18 0935 MCV 93.6 -        Review of Systems   Constitutional: Negative.    HENT:  "Negative.    Eyes: Negative.    Respiratory: Negative.    Cardiovascular: Negative.    Gastrointestinal: Negative.    Endocrine: Negative.    Genitourinary: Negative.    Musculoskeletal: Negative.    Skin: Negative.    Allergic/Immunologic: Negative.    Neurological: Negative.    Psychiatric/Behavioral: Negative.        Objective:   /60 (BP Location: Left arm, Patient Position: Sitting, BP Method: Medium (Manual))   Pulse 80   Temp 98.8 °F (37.1 °C) (Oral)   Resp 18   Ht 5' 6" (1.676 m)   Wt 56.9 kg (125 lb 7.1 oz)   SpO2 97%   BMI 20.25 kg/m²     Physical Exam   Constitutional: He is oriented to person, place, and time. He appears well-developed and well-nourished.   HENT:   Head: Normocephalic and atraumatic.   Right Ear: External ear normal.   Left Ear: External ear normal.   Nose: Nose normal.   Mouth/Throat: Oropharynx is clear and moist.   Eyes: Conjunctivae and EOM are normal. Pupils are equal, round, and reactive to light.   Neck: Normal range of motion. Neck supple.   Cardiovascular: Normal rate, regular rhythm and normal heart sounds.   Pulmonary/Chest: Effort normal and breath sounds normal.   Abdominal: Soft. Bowel sounds are normal.   Musculoskeletal: Normal range of motion.   Neurological: He is alert and oriented to person, place, and time.   Skin: Skin is warm and dry.   Psychiatric: He has a normal mood and affect. His behavior is normal.   Nursing note and vitals reviewed.      Assessment:     1. HIV disease    2. Malignant neoplasm of urinary bladder, unspecified site    Pt is to have  Gemcitabine bladder installation.  Plan:     HIV disease  -     lamivudine-zidovudine 150-300mg (COMBIVIR) 150-300 mg Tab; Take 1 tablet by mouth every 12 (twelve) hours.  Dispense: 60 tablet; Refill: 11    Malignant neoplasm of urinary bladder, unspecified site    Medically stable for any treatments necessary for bladder cancer.    "

## 2018-08-22 ENCOUNTER — TELEPHONE (OUTPATIENT)
Dept: INTERNAL MEDICINE | Facility: CLINIC | Age: 58
End: 2018-08-22

## 2018-08-23 ENCOUNTER — TELEPHONE (OUTPATIENT)
Dept: INTERNAL MEDICINE | Facility: CLINIC | Age: 58
End: 2018-08-23

## 2018-08-23 NOTE — TELEPHONE ENCOUNTER
"----- Message from Avery Dozier sent at 8/23/2018 11:25 AM CDT -----  Contact: Char Linares @[facility]   [phone]  "I need the medical clearence for [facility] surgery by Friday morning.    Fax #[phone]."  "

## 2018-08-30 ENCOUNTER — TELEPHONE (OUTPATIENT)
Dept: INTERNAL MEDICINE | Facility: CLINIC | Age: 58
End: 2018-08-30

## 2018-08-30 NOTE — TELEPHONE ENCOUNTER
----- [facility] sent at 8/22/2018  7:54 AM CDT -----  Please call pt and tell him I need him to go to the Lab for a 2 hr glucose tolerance test, because his blood sugar is too high.  TSA

## 2018-09-19 ENCOUNTER — TELEPHONE (OUTPATIENT)
Dept: FAMILY MEDICINE | Facility: CLINIC | Age: 58
End: 2018-09-19

## 2018-09-19 DIAGNOSIS — B20 HIV DISEASE: Chronic | ICD-10-CM

## 2018-09-19 NOTE — TELEPHONE ENCOUNTER
Spoke with patient and he stated that Dr. Ruiz changed his HIV medication to Lamivudine Zidovudine and wanted his to be on the medication for 6 weeks and then do blood work.     Patient stated that he was only able to complete 4 weeks of the medication because no pharmacies have this medication. Patient has not been on a HIV medication for 2 days and is wanting to know if he should start back on another medication and does Dr. Ruiz want him to complete labs now even though its only been 4 weeks on the medication mentioned above.

## 2018-09-19 NOTE — TELEPHONE ENCOUNTER
----- Message from Yolanda Toure sent at 9/19/2018  1:42 PM CDT -----  Contact: self / [phone]  Patient is requesting a call back. Please advise

## 2018-09-20 RX ORDER — LAMIVUDINE AND ZIDOVUDINE 150; 300 MG/1; MG/1
1 TABLET, FILM COATED ORAL EVERY 12 HOURS
Qty: 60 TABLET | Refills: 11 | Status: SHIPPED | OUTPATIENT
Start: 2018-09-20 | End: 2018-10-24

## 2018-09-20 NOTE — TELEPHONE ENCOUNTER
Notified pt [facility] has the Combivir now, and to take it again, with the Isentress.   In 4 weeks we will recheck his labs.    Please place lab orders for [facility].

## 2018-10-10 ENCOUNTER — TELEPHONE (OUTPATIENT)
Dept: INTERNAL MEDICINE | Facility: CLINIC | Age: 58
End: 2018-10-10

## 2018-10-10 NOTE — TELEPHONE ENCOUNTER
----- Message from Keiko Painter sent at 10/10/2018  3:13 PM CDT -----  Contact: [phone]/ self   Patient requesting to speak with you regarding orders for labs to be sent to [facility]. Pt would like call once orders are in so he'll be able to schedule appt. Please advise.

## 2018-10-12 ENCOUNTER — TELEPHONE (OUTPATIENT)
Dept: INTERNAL MEDICINE | Facility: CLINIC | Age: 58
End: 2018-10-12

## 2018-10-12 NOTE — TELEPHONE ENCOUNTER
----- Message from Josephine Godfrey sent at 10/12/2018  1:45 PM CDT -----  Contact: Self [phone]  Patient is calling to get Lab orders sent to [facility]. Please call patient when orders have been faxed

## 2018-10-20 LAB
ALBUMIN SERPL-MCNC: 4.7 G/DL (ref 3.6–5.1)
ALBUMIN/GLOB SERPL: 1.7 (CALC) (ref 1–2.5)
ALP SERPL-CCNC: 37 U/L (ref 40–115)
ALT SERPL-CCNC: 13 U/L (ref 9–46)
APPEARANCE UR: CLEAR
AST SERPL-CCNC: 21 U/L (ref 10–35)
BASOPHILS # BLD AUTO: 32 CELLS/UL (ref 0–200)
BASOPHILS NFR BLD AUTO: 0.5 %
BILIRUB SERPL-MCNC: 0.6 MG/DL (ref 0.2–1.2)
BILIRUB UR QL STRIP: NEGATIVE
BUN SERPL-MCNC: 26 MG/DL (ref 7–25)
BUN/CREAT SERPL: 19 (CALC) (ref 6–22)
CALCIUM SERPL-MCNC: 10.3 MG/DL (ref 8.6–10.3)
CD3+CD4+ CELLS # BLD: 790 CELLS/UL (ref 490–1740)
CD3+CD4+ CELLS NFR BLD: 33 % (ref 30–61)
CHLORIDE SERPL-SCNC: 104 MMOL/L (ref 98–110)
CO2 SERPL-SCNC: 24 MMOL/L (ref 20–32)
COLOR UR: YELLOW
CREAT SERPL-MCNC: 1.4 MG/DL (ref 0.7–1.33)
EOSINOPHIL # BLD AUTO: 158 CELLS/UL (ref 15–500)
EOSINOPHIL NFR BLD AUTO: 2.5 %
ERYTHROCYTE [DISTWIDTH] IN BLOOD BY AUTOMATED COUNT: 19.4 % (ref 11–15)
GFR SERPL CREATININE-BSD FRML MDRD: 56 ML/MIN/1.73M2
GLOBULIN SER CALC-MCNC: 2.7 G/DL (CALC) (ref 1.9–3.7)
GLUCOSE SERPL-MCNC: 98 MG/DL (ref 65–99)
GLUCOSE UR QL STRIP: NEGATIVE
HCT VFR BLD AUTO: 44.2 % (ref 38.5–50)
HGB BLD-MCNC: 15.5 G/DL (ref 13.2–17.1)
HGB UR QL STRIP: NEGATIVE
HIV1 RNA # SERPL NAA+PROBE: ABNORMAL COPIES/ML
HIV1 RNA SERPL NAA+PROBE-LOG#: ABNORMAL LOG COPIES/ML
KETONES UR QL STRIP: NEGATIVE
LEUKOCYTE ESTERASE UR QL STRIP: NEGATIVE
LYMPHOCYTES # BLD AUTO: 2079 CELLS/UL (ref 850–3900)
LYMPHOCYTES # BLD AUTO: 2391 CELLS/UL (ref 850–3900)
LYMPHOCYTES NFR BLD AUTO: 33 %
MCH RBC QN AUTO: 34.2 PG (ref 27–33)
MCHC RBC AUTO-ENTMCNC: 35.1 G/DL (ref 32–36)
MCV RBC AUTO: 97.6 FL (ref 80–100)
MONOCYTES # BLD AUTO: 542 CELLS/UL (ref 200–950)
MONOCYTES NFR BLD AUTO: 8.6 %
NEUTROPHILS # BLD AUTO: 3490 CELLS/UL (ref 1500–7800)
NEUTROPHILS NFR BLD AUTO: 55.4 %
NITRITE UR QL STRIP: NEGATIVE
PH UR STRIP: 6 [PH] (ref 5–8)
PLATELET # BLD AUTO: 416 THOUSAND/UL (ref 140–400)
PMV BLD REES-ECKER: 9.1 FL (ref 7.5–12.5)
POTASSIUM SERPL-SCNC: 4.5 MMOL/L (ref 3.5–5.3)
PROT SERPL-MCNC: 7.4 G/DL (ref 6.1–8.1)
PROT UR QL STRIP: NEGATIVE
RBC # BLD AUTO: 4.53 MILLION/UL (ref 4.2–5.8)
SODIUM SERPL-SCNC: 138 MMOL/L (ref 135–146)
SP GR UR STRIP: 1.01 (ref 1–1.03)
WBC # BLD AUTO: 6.3 THOUSAND/UL (ref 3.8–10.8)

## 2018-10-24 ENCOUNTER — OFFICE VISIT (OUTPATIENT)
Dept: INTERNAL MEDICINE | Facility: CLINIC | Age: 58
End: 2018-10-24
Payer: MEDICARE

## 2018-10-24 VITALS
BODY MASS INDEX: 20.27 KG/M2 | RESPIRATION RATE: 18 BRPM | DIASTOLIC BLOOD PRESSURE: 60 MMHG | TEMPERATURE: 98 F | HEIGHT: 66 IN | SYSTOLIC BLOOD PRESSURE: 100 MMHG | OXYGEN SATURATION: 97 % | WEIGHT: 126.13 LBS | HEART RATE: 71 BPM

## 2018-10-24 DIAGNOSIS — F40.9 INSOMNIA DUE TO ANXIETY AND FEAR: Chronic | ICD-10-CM

## 2018-10-24 DIAGNOSIS — F51.05 INSOMNIA DUE TO ANXIETY AND FEAR: Chronic | ICD-10-CM

## 2018-10-24 DIAGNOSIS — N52.9 ERECTILE DYSFUNCTION, UNSPECIFIED ERECTILE DYSFUNCTION TYPE: ICD-10-CM

## 2018-10-24 DIAGNOSIS — E78.2 HYPERLIPIDEMIA, MIXED: Chronic | ICD-10-CM

## 2018-10-24 DIAGNOSIS — B20 HIV DISEASE: Primary | Chronic | ICD-10-CM

## 2018-10-24 DIAGNOSIS — F41.9 ANXIETY: ICD-10-CM

## 2018-10-24 DIAGNOSIS — E80.4 GILBERTS SYNDROME: Chronic | ICD-10-CM

## 2018-10-24 PROCEDURE — 99999 PR PBB SHADOW E&M-EST. PATIENT-LVL III: CPT | Mod: PBBFAC,,, | Performed by: INTERNAL MEDICINE

## 2018-10-24 PROCEDURE — 99214 OFFICE O/P EST MOD 30 MIN: CPT | Mod: S$PBB,,, | Performed by: INTERNAL MEDICINE

## 2018-10-24 PROCEDURE — 99213 OFFICE O/P EST LOW 20 MIN: CPT | Mod: PBBFAC,PN | Performed by: INTERNAL MEDICINE

## 2018-10-24 RX ORDER — LORAZEPAM 2 MG/1
2 TABLET ORAL NIGHTLY
Qty: 30 TABLET | Refills: 3 | Status: SHIPPED | OUTPATIENT
Start: 2018-10-24 | End: 2019-04-10 | Stop reason: SDUPTHER

## 2018-10-24 RX ORDER — SILDENAFIL 100 MG/1
100 TABLET, FILM COATED ORAL DAILY PRN
Qty: 10 TABLET | Refills: 3 | Status: SHIPPED | OUTPATIENT
Start: 2018-10-24 | End: 2019-04-10 | Stop reason: SDUPTHER

## 2018-10-24 RX ORDER — CLONAZEPAM 1 MG/1
1 TABLET ORAL DAILY
Qty: 30 TABLET | Refills: 0 | Status: SHIPPED | OUTPATIENT
Start: 2018-10-24 | End: 2019-04-10 | Stop reason: SDUPTHER

## 2018-10-24 NOTE — PROGRESS NOTES
"Subjective:      Patient ID: Clint England is a 58 y.o. male.    Chief Complaint: Follow-up (2 month follow up); Results (Lab Results ); and Medication Refill    HPI: 58 y.o. White male, just got back from [place], from hs nephew's wedding. Then,  Caught a " cold,cough". And just last week found out that his nephew was killed in a motorcycle accident.  Now has to go back.  There is a great deal of animosity in his family, and he is ambivalent.   On therapy for bladder cancer.  He had had Geovani, with a crcl of < 40, so using full dose HAART was a problem, and Epzicom is no longer available.  So a new regimen must be developed for this very experienced HIV pt.      Most recent labs:  10/18/18 0803 COLORU YELLOW - Final result   10/18/18 0803 APPEARANCEUA CLEAR - Final result   10/18/18 0803 SPECGRAV 1.008 - Final result   10/18/18 0803 PHUR 6.0 - Final result   10/18/18 0803 BILIRUBINUR NEGATIVE - Final result   10/18/18 0803 KETONESU NEGATIVE - Final result   10/18/18 0803 OCCULTUA NEGATIVE - Final result   10/18/18 0803 NITRITE NEGATIVE - Final result   10/18/18 0803 LEUKOCYTESUR NEGATIVE - Final result   10/18/18 0803 WBC 6.3 - Final result   10/18/18 0803 RBC 4.53 - Final result   10/18/18 0803 HGB 15.5 - Final result   10/18/18 0803 HCT 44.2 - Final result   10/18/18 0803 MCH 34.2 Abnormal  High Final result   10/18/18 0803 RDW 19.4 Abnormal  High Final result   10/18/18 0803  Abnormal  High Final result   10/18/18 0803 MPV 9.1 - Final result   10/18/18 0803 GLU 98 - Final result   10/18/18 0803 BUN 26 Abnormal  High Final result   10/18/18 0803 CREATININE 1.40 Abnormal  High Final result   10/18/18 0803 CALCIUM 10.3 - Final result   10/18/18 0803  - Final result   10/18/18 0803 K 4.5 - Final result   10/18/18 0803  - Final result   10/18/18 0803 PROT 7.4 - Final result   10/18/18 0803 ALBUMIN 4.7 - Final result   10/18/18 0803 BILITOT 0.6 - Final result   10/18/18 0803 AST 21 - Final result " "  10/18/18 0803 ALKPHOS 37 Abnormal  Low Final result   10/18/18 0803 CO2 24 - Final result   10/18/18 0803 ALT 13 - Final result   10/18/18 0803 EGFRNONAA 56 Abnormal  Low Final result   10/18/18 0803 ESTGFRAFRICA 65 - Final result   10/18/18 0803 MCV 97.6 - Final result     CD4  790  VL  Non detected.  Review of Systems   Constitutional: Negative.    HENT: Positive for congestion and postnasal drip.    Eyes: Negative.    Respiratory: Positive for cough. Negative for chest tightness, shortness of breath and wheezing.    Cardiovascular: Negative.    Gastrointestinal: Negative.    Endocrine: Negative.    Genitourinary: Negative.    Musculoskeletal: Negative.    Skin: Negative.    Allergic/Immunologic: Negative.    Hematological: Negative.    Psychiatric/Behavioral: Negative.        Objective:   /60 (BP Location: Left arm, Patient Position: Sitting, BP Method: Medium (Manual))   Pulse 71   Temp 98.3 °F (36.8 °C) (Oral)   Resp 18   Ht 5' 6" (1.676 m)   Wt 57.2 kg (126 lb 1.6 oz)   SpO2 97%   BMI 20.35 kg/m²     Physical Exam   Constitutional: He is oriented to person, place, and time. He appears well-developed and well-nourished.   HENT:   Head: Normocephalic and atraumatic.   Nose: Nose normal.   Mouth/Throat: Oropharynx is clear and moist.   Eyes: Conjunctivae and EOM are normal. Pupils are equal, round, and reactive to light.   Neck: Normal range of motion. Neck supple.   Cardiovascular: Normal rate, regular rhythm and normal heart sounds.   Pulmonary/Chest: Effort normal and breath sounds normal.   Abdominal: Soft. Bowel sounds are normal.   Musculoskeletal: Normal range of motion.   Neurological: He is alert and oriented to person, place, and time.   Skin: Skin is warm and dry.   Psychiatric: He has a normal mood and affect. His behavior is normal.   Nursing note and vitals reviewed.      Assessment:     1. HIV disease    2. Insomnia due to anxiety and fear    3. Anxiety    4. Erectile dysfunction, " unspecified erectile dysfunction type    5. Hyperlipidemia, mixed    6. [place] syndrome    Will have to watch his renal function very closely.  Plan:     HIV disease  -     elviteg-cob-emtri-tenof ALAFEN (GENVOYA) [id number] mg Tab; Take 1 tablet by mouth once daily.  Dispense: 30 tablet; Refill: 5    Insomnia due to anxiety and fear  -     LORazepam (ATIVAN) 2 MG Tab; Take 1 tablet (2 mg total) by mouth every evening.  Dispense: 30 tablet; Refill: 3    Anxiety  -     clonazePAM (KLONOPIN) 1 MG tablet; Take 1 tablet (1 mg total) by mouth once daily.  Dispense: 30 tablet; Refill: 0    Erectile dysfunction, unspecified erectile dysfunction type  -     sildenafil (VIAGRA) 100 MG tablet; Take 1 tablet (100 mg total) by mouth daily as needed for Erectile Dysfunction.  Dispense: 10 tablet; Refill: 3    Hyperlipidemia, mixed    [place] syndrome

## 2019-03-01 DIAGNOSIS — F41.9 ANXIETY: ICD-10-CM

## 2019-03-01 DIAGNOSIS — F40.9 INSOMNIA DUE TO ANXIETY AND FEAR: Chronic | ICD-10-CM

## 2019-03-01 DIAGNOSIS — F51.05 INSOMNIA DUE TO ANXIETY AND FEAR: Chronic | ICD-10-CM

## 2019-03-04 RX ORDER — CLONAZEPAM 1 MG/1
1 TABLET ORAL DAILY
Qty: 30 TABLET | Refills: 0 | OUTPATIENT
Start: 2019-03-04

## 2019-03-04 RX ORDER — LORAZEPAM 2 MG/1
2 TABLET ORAL NIGHTLY
Qty: 30 TABLET | Refills: 3 | OUTPATIENT
Start: 2019-03-04

## 2019-03-12 ENCOUNTER — TELEPHONE (OUTPATIENT)
Dept: FAMILY MEDICINE | Facility: CLINIC | Age: 59
End: 2019-03-12

## 2019-03-12 NOTE — TELEPHONE ENCOUNTER
----- Message from Jerri Cosme sent at 3/12/2019  3:24 PM CDT -----  Contact: self, [phone]  Patient has an appointment scheduled on 4/10 but requests to have his Lorazepam prescription refill before then. Please advise.

## 2019-03-13 DIAGNOSIS — F40.9 INSOMNIA DUE TO ANXIETY AND FEAR: Chronic | ICD-10-CM

## 2019-03-13 DIAGNOSIS — F51.05 INSOMNIA DUE TO ANXIETY AND FEAR: Chronic | ICD-10-CM

## 2019-03-13 RX ORDER — LORAZEPAM 2 MG/1
2 TABLET ORAL NIGHTLY
Qty: 30 TABLET | Refills: 3 | OUTPATIENT
Start: 2019-03-13

## 2019-03-15 DIAGNOSIS — F51.05 INSOMNIA DUE TO ANXIETY AND FEAR: Chronic | ICD-10-CM

## 2019-03-15 DIAGNOSIS — F40.9 INSOMNIA DUE TO ANXIETY AND FEAR: Chronic | ICD-10-CM

## 2019-03-15 NOTE — TELEPHONE ENCOUNTER
Daly with patient he has an appointment set up for April and would like to get a refill of Lorazepam. Vf/ma

## 2019-03-18 ENCOUNTER — TELEPHONE (OUTPATIENT)
Dept: FAMILY MEDICINE | Facility: CLINIC | Age: 59
End: 2019-03-18

## 2019-03-18 NOTE — TELEPHONE ENCOUNTER
spoke with patient  In reference to him getting his Lorazepam refilled by Dr. Ruiz. He is some what upset because he is requesting medication refill on LORAZEPAM. He fell like this medication  Is simple and should be refilled. Nelida CAI sent patient a message that he has to make an appointment to see one of the physician before this medication can be filled. I put in a request for patient to get this medication filled. I told him that I would send this request to Dr. Ruiz computer for his refill. I did not garantee this patient that this Rx would be filled or approved. Patient has an appointment to see Dr. Ruiz on 4/10/2019. He will keep this appointment.vf/ma

## 2019-03-19 ENCOUNTER — TELEPHONE (OUTPATIENT)
Dept: FAMILY MEDICINE | Facility: CLINIC | Age: 59
End: 2019-03-19

## 2019-03-19 NOTE — TELEPHONE ENCOUNTER
----- Message from Princess Crespo sent at 3/18/2019  2:42 PM CDT -----  Contact: Self/ [phone]  2nd request  Type:  RX Refill Request    Who Called:Patient  Refill or New Rx: Refill  RX Name and Strength:clonazePAM (KLONOPIN) 1 MG tablet  How is the patient currently taking it? (ex. 1XDay): clonazePAM (KLONOPIN) 1 MG tablet  Is this a 30 day or 90 day RX:30 days  Preferred Pharmacy with phone number: [facility] - [place], [place] - [address]  Local or Mail Order: Local  Ordering Provider: Dr. Ruiz  Would the patient rather a call back or a response via [facility]? callback  Best Call Back Number:[phone]  Additional Information: NA

## 2019-03-19 NOTE — TELEPHONE ENCOUNTER
Dr. Ruiz stated she would not refill any medications until she saw the patient. Patient was notified.

## 2019-03-22 RX ORDER — LORAZEPAM 2 MG/1
2 TABLET ORAL NIGHTLY
Qty: 30 TABLET | Refills: 3 | OUTPATIENT
Start: 2019-03-22

## 2019-04-10 ENCOUNTER — OFFICE VISIT (OUTPATIENT)
Dept: INTERNAL MEDICINE | Facility: CLINIC | Age: 59
End: 2019-04-10
Payer: MEDICARE

## 2019-04-10 VITALS
RESPIRATION RATE: 18 BRPM | OXYGEN SATURATION: 96 % | SYSTOLIC BLOOD PRESSURE: 110 MMHG | HEIGHT: 66 IN | TEMPERATURE: 99 F | WEIGHT: 127.88 LBS | HEART RATE: 76 BPM | BODY MASS INDEX: 20.55 KG/M2 | DIASTOLIC BLOOD PRESSURE: 60 MMHG

## 2019-04-10 DIAGNOSIS — E78.2 HYPERLIPIDEMIA, MIXED: Chronic | ICD-10-CM

## 2019-04-10 DIAGNOSIS — J30.9 ALLERGIC RHINITIS, UNSPECIFIED SEASONALITY, UNSPECIFIED TRIGGER: ICD-10-CM

## 2019-04-10 DIAGNOSIS — Z23 NEED FOR PNEUMOCOCCAL VACCINATION: ICD-10-CM

## 2019-04-10 DIAGNOSIS — F13.20 BENZODIAZEPINE DEPENDENCE, CONTINUOUS: ICD-10-CM

## 2019-04-10 DIAGNOSIS — K50.919 CROHN'S DISEASE WITH COMPLICATION, UNSPECIFIED GASTROINTESTINAL TRACT LOCATION: ICD-10-CM

## 2019-04-10 DIAGNOSIS — B20 HIV DISEASE: Chronic | ICD-10-CM

## 2019-04-10 DIAGNOSIS — N52.9 ERECTILE DYSFUNCTION, UNSPECIFIED ERECTILE DYSFUNCTION TYPE: ICD-10-CM

## 2019-04-10 DIAGNOSIS — F40.9 INSOMNIA DUE TO ANXIETY AND FEAR: Chronic | ICD-10-CM

## 2019-04-10 DIAGNOSIS — F41.9 ANXIETY: ICD-10-CM

## 2019-04-10 DIAGNOSIS — C67.9 MALIGNANT NEOPLASM OF URINARY BLADDER, UNSPECIFIED SITE: Primary | ICD-10-CM

## 2019-04-10 DIAGNOSIS — F51.05 INSOMNIA DUE TO ANXIETY AND FEAR: Chronic | ICD-10-CM

## 2019-04-10 DIAGNOSIS — F13.939: ICD-10-CM

## 2019-04-10 PROCEDURE — 99215 OFFICE O/P EST HI 40 MIN: CPT | Mod: S$PBB,,, | Performed by: INTERNAL MEDICINE

## 2019-04-10 PROCEDURE — 99999 PR PBB SHADOW E&M-EST. PATIENT-LVL III: ICD-10-PCS | Mod: PBBFAC,,, | Performed by: INTERNAL MEDICINE

## 2019-04-10 PROCEDURE — 99215 PR OFFICE/OUTPT VISIT, EST, LEVL V, 40-54 MIN: ICD-10-PCS | Mod: S$PBB,,, | Performed by: INTERNAL MEDICINE

## 2019-04-10 PROCEDURE — 99213 OFFICE O/P EST LOW 20 MIN: CPT | Mod: PBBFAC,PN | Performed by: INTERNAL MEDICINE

## 2019-04-10 PROCEDURE — 99999 PR PBB SHADOW E&M-EST. PATIENT-LVL III: CPT | Mod: PBBFAC,,, | Performed by: INTERNAL MEDICINE

## 2019-04-10 RX ORDER — DUTASTERIDE 0.5 MG/1
CAPSULE, LIQUID FILLED ORAL
COMMUNITY
Start: 2019-03-27

## 2019-04-10 RX ORDER — CLONAZEPAM 1 MG/1
1 TABLET ORAL DAILY
Qty: 30 TABLET | Refills: 0 | Status: SHIPPED | OUTPATIENT
Start: 2019-04-10 | End: 2019-08-14 | Stop reason: SDUPTHER

## 2019-04-10 RX ORDER — PRAVASTATIN SODIUM 20 MG/1
20 TABLET ORAL NIGHTLY
Qty: 90 TABLET | Refills: 3 | Status: SHIPPED | OUTPATIENT
Start: 2019-04-10 | End: 2019-11-21 | Stop reason: SDUPTHER

## 2019-04-10 RX ORDER — SILDENAFIL 100 MG/1
100 TABLET, FILM COATED ORAL DAILY PRN
Qty: 10 TABLET | Refills: 3 | Status: SHIPPED | OUTPATIENT
Start: 2019-04-10 | End: 2019-11-21 | Stop reason: SDUPTHER

## 2019-04-10 RX ORDER — MOMETASONE FUROATE 50 UG/1
2 SPRAY, METERED NASAL DAILY
Qty: 17 G | Refills: 3 | Status: SHIPPED | OUTPATIENT
Start: 2019-04-10

## 2019-04-10 RX ORDER — LORAZEPAM 2 MG/1
2 TABLET ORAL NIGHTLY
Qty: 30 TABLET | Refills: 3 | Status: SHIPPED | OUTPATIENT
Start: 2019-04-10 | End: 2019-11-21

## 2019-04-10 RX ORDER — FENOFIBRATE 145 MG/1
145 TABLET, FILM COATED ORAL DAILY
Qty: 90 TABLET | Refills: 3 | Status: SHIPPED | OUTPATIENT
Start: 2019-04-10 | End: 2019-11-21 | Stop reason: SDUPTHER

## 2019-04-10 RX ORDER — OMEGA-3-ACID ETHYL ESTERS 1 G/1
1 CAPSULE, LIQUID FILLED ORAL 2 TIMES DAILY
Qty: 180 CAPSULE | Refills: 3 | Status: SHIPPED | OUTPATIENT
Start: 2019-04-10 | End: 2019-11-21 | Stop reason: SDUPTHER

## 2019-04-10 NOTE — PROGRESS NOTES
"Subjective:      Patient ID: Clint England is a 58 y.o. male.    Chief Complaint: Follow-up (6 month follow up); Medication Refill; Pneumonia (pt due ); and Tetanus Vaccine (pt due)    HPI: 58 y.o. White male, here for his follow up for HIV disease.  He recently found out that his bladder cancer has recurred, after apparent successful treatment.  This issue, together with the prospect of pain, when each cysto/bladder insufflation is done, is causing  Great anguish.  Unable to sleep.           Review of Systems   Constitutional: Positive for activity change, appetite change and fatigue.   HENT: Positive for congestion, ear pain, rhinorrhea and sinus pressure.    Eyes: Negative.    Respiratory: Positive for chest tightness and shortness of breath. Negative for cough and wheezing.         Thinks this is anxiety   Cardiovascular: Negative for chest pain, palpitations and leg swelling.   Gastrointestinal: Negative.  Negative for abdominal distention, anal bleeding, blood in stool, constipation, diarrhea, nausea and vomiting.   Endocrine: Negative.    Genitourinary: Positive for frequency and penile pain. Negative for difficulty urinating and dysuria.   Musculoskeletal: Positive for back pain and neck pain.   Skin: Negative.    Allergic/Immunologic: Positive for immunocompromised state.   Neurological: Negative.    Hematological: Negative.    Psychiatric/Behavioral: Positive for dysphoric mood and sleep disturbance. The patient is nervous/anxious.        Objective:   /60 (BP Location: Left arm, Patient Position: Sitting, BP Method: Medium (Manual))   Pulse 76   Temp 98.6 °F (37 °C) (Oral)   Resp 18   Ht 5' 6" (1.676 m)   Wt 58 kg (127 lb 14.4 oz)   SpO2 96%   BMI 20.64 kg/m²     Physical Exam   Constitutional: He is oriented to person, place, and time. He appears well-developed and well-nourished.   HENT:   Head: Normocephalic and atraumatic.   Right Ear: External ear normal.   Left Ear: External ear " normal.   Nose: Nose normal.   Mouth/Throat: Oropharynx is clear and moist.   Eyes: Pupils are equal, round, and reactive to light. Conjunctivae and EOM are normal.   Neck: Normal range of motion. Neck supple.   Cardiovascular: Normal rate, regular rhythm and normal heart sounds.   Pulmonary/Chest: Effort normal and breath sounds normal.   Abdominal: Soft. Bowel sounds are normal.   Musculoskeletal: Normal range of motion.   Lymphadenopathy:     He has no cervical adenopathy.     He has no axillary adenopathy.   Neurological: He is alert and oriented to person, place, and time.   Skin: Skin is warm and dry.   Psychiatric: He has a normal mood and affect. His behavior is normal. Judgment and thought content normal.   Nursing note and vitals reviewed.      Assessment:     1. Malignant neoplasm of urinary bladder, unspecified site    2. Need for pneumococcal vaccination    3. Hyperlipidemia, mixed    4. HIV disease    5. Insomnia due to anxiety and fear    6. Anxiety    7. Allergic rhinitis, unspecified seasonality, unspecified trigger    8. Erectile dysfunction, unspecified erectile dysfunction type    9. Crohn's disease with complication, unspecified gastrointestinal tract location    10. Withdrawal from benzodiazepine, with unspecified complication    11. Benzodiazepine dependence, continuous      Plan:     Malignant neoplasm of urinary bladder, unspecified site    Need for pneumococcal vaccination  -     Pneumococcal Conjugate Vaccine (13 Valent) (IM)    Hyperlipidemia, mixed  -     Lipid panel; Future; Expected date: 04/10/2019  -     fenofibrate (TRICOR) 145 MG tablet; Take 1 tablet (145 mg total) by mouth once daily.  Dispense: 90 tablet; Refill: 3  -     pravastatin (PRAVACHOL) 20 MG tablet; Take 1 tablet (20 mg total) by mouth nightly.  Dispense: 90 tablet; Refill: 3  -     omega-3 acid ethyl esters (LOVAZA) 1 gram capsule; Take 1 capsule (1 g total) by mouth 2 (two) times daily.  Dispense: 180 capsule;  Refill: 3    HIV disease  -     HIV RNA, quantitative, PCR; Future; Expected date: 04/10/2019  -     Lymphocyte Subset Panel 5 T-Hedley/Inducer; Future; Expected date: 04/10/2019  -     CBC auto differential; Future; Expected date: 04/10/2019  -     Comprehensive metabolic panel; Future; Expected date: 04/10/2019  -     elviteg-cob-emtri-tenof ALAFEN (GENVOYA) [phone] mg Tab; Take 1 tablet by mouth once daily.  Dispense: 30 tablet; Refill: 5    Insomnia due to anxiety and fear  -     LORazepam (ATIVAN) 2 MG Tab; Take 1 tablet (2 mg total) by mouth every evening.  Dispense: 30 tablet; Refill: 3    Anxiety  -     clonazePAM (KLONOPIN) 1 MG tablet; Take 1 tablet (1 mg total) by mouth once daily.  Dispense: 30 tablet; Refill: 0    Allergic rhinitis, unspecified seasonality, unspecified trigger  -     mometasone (NASONEX) 50 mcg/actuation nasal spray; 2 sprays by Nasal route once daily.  Dispense: 17 g; Refill: 3    Erectile dysfunction, unspecified erectile dysfunction type  -     Urinalysis; Future; Expected date: 04/10/2019  -     sildenafil (VIAGRA) 100 MG tablet; Take 1 tablet (100 mg total) by mouth daily as needed for Erectile Dysfunction.  Dispense: 10 tablet; Refill: 3    Crohn's disease with complication, unspecified gastrointestinal tract location    Withdrawal from benzodiazepine, with unspecified complication    Benzodiazepine dependence, continuous

## 2019-04-23 PROBLEM — F13.939: Status: ACTIVE | Noted: 2019-04-23

## 2019-06-12 LAB
ALBUMIN SERPL-MCNC: 4.6 G/DL (ref 3.6–5.1)
ALBUMIN/GLOB SERPL: 1.8 (CALC) (ref 1–2.5)
ALP SERPL-CCNC: 43 U/L (ref 40–115)
ALT SERPL-CCNC: 18 U/L (ref 9–46)
APPEARANCE UR: CLEAR
AST SERPL-CCNC: 19 U/L (ref 10–35)
BASOPHILS # BLD AUTO: 49 CELLS/UL (ref 0–200)
BASOPHILS NFR BLD AUTO: 0.7 %
BILIRUB SERPL-MCNC: 0.6 MG/DL (ref 0.2–1.2)
BILIRUB UR QL STRIP: NEGATIVE
BUN SERPL-MCNC: 22 MG/DL (ref 7–25)
BUN/CREAT SERPL: 15 (CALC) (ref 6–22)
CALCIUM SERPL-MCNC: 10.1 MG/DL (ref 8.6–10.3)
CD3+CD4+ CELLS # BLD: 682 CELLS/UL (ref 490–1740)
CD3+CD4+ CELLS NFR BLD: 33 % (ref 30–61)
CHLORIDE SERPL-SCNC: 104 MMOL/L (ref 98–110)
CHOLEST SERPL-MCNC: 191 MG/DL
CHOLEST/HDLC SERPL: 4 (CALC)
CO2 SERPL-SCNC: 27 MMOL/L (ref 20–32)
COLOR UR: YELLOW
CREAT SERPL-MCNC: 1.46 MG/DL (ref 0.7–1.33)
EOSINOPHIL # BLD AUTO: 189 CELLS/UL (ref 15–500)
EOSINOPHIL NFR BLD AUTO: 2.7 %
ERYTHROCYTE [DISTWIDTH] IN BLOOD BY AUTOMATED COUNT: 13.1 % (ref 11–15)
GFRSERPLBLD MDRD-ARVRAT: 53 ML/MIN/1.73M2
GLOBULIN SER CALC-MCNC: 2.6 G/DL (CALC) (ref 1.9–3.7)
GLUCOSE SERPL-MCNC: 87 MG/DL (ref 65–99)
GLUCOSE UR QL STRIP: NEGATIVE
HCT VFR BLD AUTO: 42.5 % (ref 38.5–50)
HDLC SERPL-MCNC: 48 MG/DL
HGB BLD-MCNC: 14.9 G/DL (ref 13.2–17.1)
HGB UR QL STRIP: ABNORMAL
HIV1 RNA # SERPL NAA+PROBE: NORMAL COPIES/ML
HIV1 RNA SERPL NAA+PROBE-LOG#: NORMAL LOG COPIES/ML
KETONES UR QL STRIP: NEGATIVE
LDLC SERPL CALC-MCNC: 111 MG/DL (CALC)
LEUKOCYTE ESTERASE UR QL STRIP: NEGATIVE
LYMPHOCYTES # BLD AUTO: 2030 CELLS/UL (ref 850–3900)
LYMPHOCYTES # BLD AUTO: 2085 CELLS/UL (ref 850–3900)
LYMPHOCYTES NFR BLD AUTO: 29 %
MCH RBC QN AUTO: 32.7 PG (ref 27–33)
MCHC RBC AUTO-ENTMCNC: 35.1 G/DL (ref 32–36)
MCV RBC AUTO: 93.2 FL (ref 80–100)
MONOCYTES # BLD AUTO: 546 CELLS/UL (ref 200–950)
MONOCYTES NFR BLD AUTO: 7.8 %
NEUTROPHILS # BLD AUTO: 4186 CELLS/UL (ref 1500–7800)
NEUTROPHILS NFR BLD AUTO: 59.8 %
NITRITE UR QL STRIP: NEGATIVE
NONHDLC SERPL-MCNC: 143 MG/DL (CALC)
PH UR STRIP: 6 [PH] (ref 5–8)
PLATELET # BLD AUTO: 322 THOUSAND/UL (ref 140–400)
PMV BLD REES-ECKER: 9.7 FL (ref 7.5–12.5)
POTASSIUM SERPL-SCNC: 4.2 MMOL/L (ref 3.5–5.3)
PROT SERPL-MCNC: 7.2 G/DL (ref 6.1–8.1)
PROT UR QL STRIP: NEGATIVE
RBC # BLD AUTO: 4.56 MILLION/UL (ref 4.2–5.8)
SODIUM SERPL-SCNC: 140 MMOL/L (ref 135–146)
SP GR UR STRIP: 1.01 (ref 1–1.03)
TRIGL SERPL-MCNC: 203 MG/DL
WBC # BLD AUTO: 7 THOUSAND/UL (ref 3.8–10.8)

## 2019-08-14 ENCOUNTER — OFFICE VISIT (OUTPATIENT)
Dept: INTERNAL MEDICINE | Facility: CLINIC | Age: 59
End: 2019-08-14
Payer: MEDICARE

## 2019-08-14 VITALS
WEIGHT: 127.5 LBS | DIASTOLIC BLOOD PRESSURE: 60 MMHG | OXYGEN SATURATION: 98 % | HEIGHT: 66 IN | HEART RATE: 64 BPM | TEMPERATURE: 99 F | SYSTOLIC BLOOD PRESSURE: 110 MMHG | RESPIRATION RATE: 18 BRPM | BODY MASS INDEX: 20.49 KG/M2

## 2019-08-14 DIAGNOSIS — F51.05 INSOMNIA DUE TO ANXIETY AND FEAR: Chronic | ICD-10-CM

## 2019-08-14 DIAGNOSIS — F40.9 INSOMNIA DUE TO ANXIETY AND FEAR: Chronic | ICD-10-CM

## 2019-08-14 DIAGNOSIS — F41.9 ANXIETY: ICD-10-CM

## 2019-08-14 DIAGNOSIS — C67.9 MALIGNANT NEOPLASM OF BLADDER WALL: ICD-10-CM

## 2019-08-14 DIAGNOSIS — F17.200 SMOKER UNMOTIVATED TO QUIT: ICD-10-CM

## 2019-08-14 DIAGNOSIS — B20 HIV DISEASE: Chronic | ICD-10-CM

## 2019-08-14 DIAGNOSIS — N18.30 CKD (CHRONIC KIDNEY DISEASE) STAGE 3, GFR 30-59 ML/MIN: Primary | ICD-10-CM

## 2019-08-14 DIAGNOSIS — E80.4 GILBERTS SYNDROME: Chronic | ICD-10-CM

## 2019-08-14 PROCEDURE — 99999 PR PBB SHADOW E&M-EST. PATIENT-LVL III: CPT | Mod: PBBFAC,,, | Performed by: INTERNAL MEDICINE

## 2019-08-14 PROCEDURE — 99214 PR OFFICE/OUTPT VISIT, EST, LEVL IV, 30-39 MIN: ICD-10-PCS | Mod: S$PBB,,, | Performed by: INTERNAL MEDICINE

## 2019-08-14 PROCEDURE — 99213 OFFICE O/P EST LOW 20 MIN: CPT | Mod: PBBFAC,PN | Performed by: INTERNAL MEDICINE

## 2019-08-14 PROCEDURE — 99214 OFFICE O/P EST MOD 30 MIN: CPT | Mod: S$PBB,,, | Performed by: INTERNAL MEDICINE

## 2019-08-14 PROCEDURE — 99999 PR PBB SHADOW E&M-EST. PATIENT-LVL III: ICD-10-PCS | Mod: PBBFAC,,, | Performed by: INTERNAL MEDICINE

## 2019-08-14 RX ORDER — VARENICLINE TARTRATE 1 MG/1
TABLET, FILM COATED ORAL
COMMUNITY
Start: 2019-06-29

## 2019-08-14 RX ORDER — VARENICLINE TARTRATE 0.5 (11)-1
KIT ORAL
COMMUNITY
Start: 2019-06-29

## 2019-08-14 RX ORDER — CLONAZEPAM 1 MG/1
1 TABLET ORAL DAILY
Qty: 30 TABLET | Refills: 3 | Status: SHIPPED | OUTPATIENT
Start: 2019-08-14 | End: 2019-11-21 | Stop reason: SDUPTHER

## 2019-08-25 NOTE — PROGRESS NOTES
INTERNAL MEDICINE    Patient Active Problem List   Diagnosis    Hyperlipidemia, mixed    [place] syndrome    Insomnia due to anxiety and fear    HIV disease    Crohn's disease    Allergic rhinitis    Vertigo    Neuropathy    Withdrawal from benzodiazepine, with unspecified complication       CC:   Chief Complaint   Patient presents with    HIV Positive/AIDS    Medication Refill    Flu Vaccine     pt due    Tetanus Vaccine     pt due    Pneumonia     pt due       SUBJECTIVE:  Clint England   is a 59 y.o. male  HPI   59y/oWM, being followed for:  -HIV disease, last CD4  682  With an HIV viral load non detected.  -bladder carcinoma  -tobacco dependency  -CKD Stage 3  -Gilbert's  -Insomnia and anxiety  -Hyperlipidemia  -ED    He needed to have another bladder instalation for a recurrence of his carcinoma on the wall.  Finally, discussion re: tobacco makes sense to him.    ROS: Review of Systems   Constitutional: Negative.    HENT: Negative.    Respiratory: Negative.    Cardiovascular: Negative.    Gastrointestinal: Negative.    Genitourinary: Positive for frequency and urgency.   Musculoskeletal: Positive for back pain and neck pain.   Skin: Negative.    Neurological: Negative.    Hematological: Negative.    Psychiatric/Behavioral: Positive for dysphoric mood. The patient is nervous/anxious.        Past Medical History:   Diagnosis Date    Allergy     Anxiety     Crohn's disease     Depression     Gilbert's syndrome     Gout     HIV infection     Hyperlipidemia     Sleep apnea        Past Surgical History:   Procedure Laterality Date    ADENOIDECTOMY      EYE SURGERY      HERNIA REPAIR Left     KNEE SURGERY Left        Family History   Problem Relation Age of Onset    Heart disease Mother     Cancer Mother     Heart disease Father     Cancer Sister     Heart disease Brother     No Known Problems Brother        Social History     Tobacco Use    Smoking status: Current Every Day  Smoker     Packs/day: 2.00     Types: Cigarettes    Smokeless tobacco: Never Used   Substance Use Topics    Alcohol use: Yes     Alcohol/week: 1.8 oz     Types: 3 Shots of liquor per week    Drug use: Yes     Frequency: 2.0 times per week     Types: Marijuana       Social History     Social History Narrative    Not on file       ALLERGIES:   Review of patient's allergies indicates:   Allergen Reactions    Norvir [ritonavir]        MEDS:   Current Outpatient Medications:     CHANTIX 1 mg Tab, , Disp: , Rfl:     CHANTIX STARTING MONTH BOX 0.5 mg (11)- 1 mg (42) tablet, , Disp: , Rfl:     clonazePAM (KLONOPIN) 1 MG tablet, Take 1 tablet (1 mg total) by mouth once daily., Disp: 30 tablet, Rfl: 3    dutasteride (AVODART) 0.5 mg capsule, , Disp: , Rfl:     elviteg-cob-emtri-tenof ALAFEN (GENVOYA) [phone] mg Tab, Take 1 tablet by mouth once daily., Disp: 30 tablet, Rfl: 5    fenofibrate (TRICOR) 145 MG tablet, Take 1 tablet (145 mg total) by mouth once daily., Disp: 90 tablet, Rfl: 3    finasteride (PROPECIA) 1 mg tablet, Take 1 mg by mouth once daily., Disp: , Rfl:     LORazepam (ATIVAN) 2 MG Tab, Take 1 tablet (2 mg total) by mouth every evening., Disp: 30 tablet, Rfl: 3    mometasone (NASONEX) 50 mcg/actuation nasal spray, 2 sprays by Nasal route once daily., Disp: 17 g, Rfl: 3    multivitamin (ONE DAILY MULTIVITAMIN) per tablet, Take 1 tablet by mouth once daily., Disp: , Rfl:     omega-3 acid ethyl esters (LOVAZA) 1 gram capsule, Take 1 capsule (1 g total) by mouth 2 (two) times daily., Disp: 180 capsule, Rfl: 3    pravastatin (PRAVACHOL) 20 MG tablet, Take 1 tablet (20 mg total) by mouth nightly., Disp: 90 tablet, Rfl: 3    sildenafil (VIAGRA) 100 MG tablet, Take 1 tablet (100 mg total) by mouth daily as needed for Erectile Dysfunction., Disp: 10 tablet, Rfl: 3    OBJECTIVE:   Vitals:    08/14/19 1610   BP: 110/60   BP Location: Left arm   Patient Position: Sitting   BP Method: Medium  "(Manual)   Pulse: 64   Resp: 18   Temp: 98.6 °F (37 °C)   TempSrc: Oral   SpO2: 98%   Weight: 57.8 kg (127 lb 8 oz)   Height: 5' 6" (1.676 m)     Body mass index is 20.58 kg/m².    Physical Exam   Constitutional: He is oriented to person, place, and time. Vital signs are normal. He appears well-developed and well-nourished. He is sleeping, active and cooperative.  Non-toxic appearance. He does not have a sickly appearance. He does not appear ill.   HENT:   Head: Normocephalic and atraumatic. Not microcephalic. Head is without raccoon's eyes, without Bravo's sign, without abrasion, without contusion, without laceration, without right periorbital erythema and without left periorbital erythema. Hair is normal.   Right Ear: Hearing, tympanic membrane, external ear and ear canal normal.   Left Ear: Hearing, tympanic membrane, external ear and ear canal normal.   Nose: Nose normal.   Mouth/Throat: Uvula is midline and oropharynx is clear and moist.   Eyes: Pupils are equal, round, and reactive to light. Conjunctivae, EOM and lids are normal.   Neck: Trachea normal, normal range of motion, full passive range of motion without pain and phonation normal. Neck supple. Normal carotid pulses, no hepatojugular reflux and no JVD present. Carotid bruit is not present. No Brudzinski's sign and no Kernig's sign noted. No thyroid mass present.   Cardiovascular: Normal rate, regular rhythm, S1 normal, S2 normal and normal heart sounds.  No extrasystoles are present. PMI is not displaced.   Pulmonary/Chest: Effort normal and breath sounds normal.   Abdominal: Soft. Normal appearance and bowel sounds are normal. There is no hepatosplenomegaly.   Genitourinary: Testes normal. Cremasteric reflex is present.   Musculoskeletal: Normal range of motion.   Lymphadenopathy:        Head (right side): No submental, no submandibular, no tonsillar, no preauricular, no posterior auricular and no occipital adenopathy present.        Head (left " side): No submental, no submandibular, no tonsillar, no preauricular, no posterior auricular and no occipital adenopathy present.     He has no cervical adenopathy.     He has no axillary adenopathy.        Right: No inguinal, no supraclavicular and no epitrochlear adenopathy present.        Left: No inguinal, no supraclavicular and no epitrochlear adenopathy present.   Neurological: He is alert and oriented to person, place, and time. He has normal strength and normal reflexes. No sensory deficit. He displays a negative Romberg sign.   Reflex Scores:       Tricep reflexes are 2+ on the right side and 2+ on the left side.       Bicep reflexes are 2+ on the right side and 2+ on the left side.       Brachioradialis reflexes are 2+ on the right side and 2+ on the left side.       Patellar reflexes are 2+ on the right side and 2+ on the left side.       Achilles reflexes are 2+ on the right side and 2+ on the left side.  Skin: Skin is warm and intact. No abrasion, no bruising, no burn, no ecchymosis, no laceration, no lesion, no petechiae and no purpura noted. Rash is not macular, not papular, not maculopapular, not nodular, not pustular, not vesicular and not urticarial. No cyanosis. Nails show no clubbing.   Psychiatric: He has a normal mood and affect. His speech is normal and behavior is normal. Judgment and thought content normal. He is not actively hallucinating. Cognition and memory are normal.   Nursing note and vitals reviewed.        PERTINENT RESULTS:   CBC:  Recent Labs   Lab Result Units 06/10/19  0957   WBC Thousand/uL 7.0   RBC Million/uL 4.56   Hemoglobin g/dL 14.9   Hematocrit % 42.5   Platelets Thousand/uL 322   Mean Corpuscular Volume fL 93.2   Mean Corpuscular Hemoglobin pg 32.7   Mean Corpuscular Hemoglobin Conc g/dL 35.1     CMP:  Recent Labs   Lab Result Units 06/10/19  0957   Glucose mg/dL 87   Calcium mg/dL 10.1   Albumin g/dL 4.6   Total Protein g/dL 7.2   Sodium mmol/L 140   Potassium mmol/L  4.2   CO2 mmol/L 27   Chloride mmol/L 104   BUN, Bld mg/dL 22   Alkaline Phosphatase U/L 43   ALT U/L 18   AST U/L 19   Total Bilirubin mg/dL 0.6     URINALYSIS:  Recent Labs   Lab Result Units 06/10/19  0957   Color, UA  YELLOW   Specific Gravity, UA  1.010   pH, UA  6.0   Protein, UA  NEGATIVE   Glucose, UA  NEGATIVE   Nitrite, UA  NEGATIVE   Leukocytes, UA  NEGATIVE      LIPIDS:  Recent Labs   Lab Result Units 06/10/19  0957   HDL mg/dL 48   Cholesterol mg/dL 191   Triglycerides mg/dL 203*   LDL Cholesterol mg/dL (calc) 111*   Hdl/Cholesterol Ratio (calc) 4.0   Non HDL Chol. (LDL+VLDL) mg/dL (calc) 143*             ASSESSMENT:  Problem List Items Addressed This Visit        Psychiatric    Insomnia due to anxiety and fear (Chronic)       ID    HIV disease (Chronic)       GI    [place] syndrome (Chronic)      Other Visit Diagnoses     CKD (chronic kidney disease) stage 3, GFR 30-59 ml/min    -  Primary    Anxiety        Relevant Medications    clonazePAM (KLONOPIN) 1 MG tablet    Malignant neoplasm of bladder wall        Smoker unmotivated to quit          I have been watching his renal function, and switched his HAART to try to recover function.    PLAN:   Orders Placed This Encounter    clonazePAM (KLONOPIN) 1 MG tablet     No orders of the defined types were placed in this encounter.      Follow-up with me in 6 months  Dr. Felisha Ruiz  Internal Medicine

## 2019-11-01 DIAGNOSIS — B20 HIV DISEASE: Chronic | ICD-10-CM

## 2019-11-01 NOTE — TELEPHONE ENCOUNTER
----- Message from Kenny Silva sent at 11/1/2019 12:48 PM CDT -----  Contact: [phone]/ self   Patient requesting to speak with you regarding getting a refill on his medication. elviteg-cob-emtri-tenof ALAFEN (GENVOYA) [phone] mg Tab. Please call.

## 2019-11-21 ENCOUNTER — OFFICE VISIT (OUTPATIENT)
Dept: INTERNAL MEDICINE | Facility: CLINIC | Age: 59
End: 2019-11-21
Payer: MEDICARE

## 2019-11-21 VITALS
WEIGHT: 128.88 LBS | HEIGHT: 66 IN | DIASTOLIC BLOOD PRESSURE: 60 MMHG | HEART RATE: 66 BPM | TEMPERATURE: 98 F | RESPIRATION RATE: 18 BRPM | OXYGEN SATURATION: 98 % | SYSTOLIC BLOOD PRESSURE: 120 MMHG | BODY MASS INDEX: 20.71 KG/M2

## 2019-11-21 DIAGNOSIS — K50.919 CROHN'S DISEASE WITH COMPLICATION, UNSPECIFIED GASTROINTESTINAL TRACT LOCATION: ICD-10-CM

## 2019-11-21 DIAGNOSIS — F51.05 INSOMNIA DUE TO ANXIETY AND FEAR: Chronic | ICD-10-CM

## 2019-11-21 DIAGNOSIS — F41.9 ANXIETY: ICD-10-CM

## 2019-11-21 DIAGNOSIS — Z23 NEED FOR PNEUMOCOCCAL VACCINATION: ICD-10-CM

## 2019-11-21 DIAGNOSIS — N52.9 ERECTILE DYSFUNCTION, UNSPECIFIED ERECTILE DYSFUNCTION TYPE: ICD-10-CM

## 2019-11-21 DIAGNOSIS — E78.2 HYPERLIPIDEMIA, MIXED: Chronic | ICD-10-CM

## 2019-11-21 DIAGNOSIS — E80.4 GILBERTS SYNDROME: Chronic | ICD-10-CM

## 2019-11-21 DIAGNOSIS — B20 HIV DISEASE: Primary | Chronic | ICD-10-CM

## 2019-11-21 DIAGNOSIS — F40.9 INSOMNIA DUE TO ANXIETY AND FEAR: Chronic | ICD-10-CM

## 2019-11-21 DIAGNOSIS — J30.89 ALLERGIC RHINITIS DUE TO OTHER ALLERGIC TRIGGER, UNSPECIFIED SEASONALITY: ICD-10-CM

## 2019-11-21 DIAGNOSIS — C67.8 MALIGNANT NEOPLASM OF OVERLAPPING SITES OF BLADDER: ICD-10-CM

## 2019-11-21 PROBLEM — F13.939: Status: RESOLVED | Noted: 2019-04-23 | Resolved: 2019-11-21

## 2019-11-21 PROCEDURE — 99213 OFFICE O/P EST LOW 20 MIN: CPT | Mod: PBBFAC,PN | Performed by: INTERNAL MEDICINE

## 2019-11-21 PROCEDURE — 99214 OFFICE O/P EST MOD 30 MIN: CPT | Mod: S$PBB,,, | Performed by: INTERNAL MEDICINE

## 2019-11-21 PROCEDURE — 99999 PR PBB SHADOW E&M-EST. PATIENT-LVL III: ICD-10-PCS | Mod: PBBFAC,,, | Performed by: INTERNAL MEDICINE

## 2019-11-21 PROCEDURE — 99999 PR PBB SHADOW E&M-EST. PATIENT-LVL III: CPT | Mod: PBBFAC,,, | Performed by: INTERNAL MEDICINE

## 2019-11-21 PROCEDURE — 99214 PR OFFICE/OUTPT VISIT, EST, LEVL IV, 30-39 MIN: ICD-10-PCS | Mod: S$PBB,,, | Performed by: INTERNAL MEDICINE

## 2019-11-21 RX ORDER — CLONAZEPAM 1 MG/1
1 TABLET ORAL DAILY
Qty: 30 TABLET | Refills: 3 | Status: SHIPPED | OUTPATIENT
Start: 2019-11-21 | End: 2019-12-09 | Stop reason: SDUPTHER

## 2019-11-21 RX ORDER — FENOFIBRATE 145 MG/1
145 TABLET, FILM COATED ORAL DAILY
Qty: 90 TABLET | Refills: 3 | Status: SHIPPED | OUTPATIENT
Start: 2019-11-21 | End: 2020-11-20

## 2019-11-21 RX ORDER — SILDENAFIL 100 MG/1
100 TABLET, FILM COATED ORAL DAILY PRN
Qty: 10 TABLET | Refills: 3 | Status: SHIPPED | OUTPATIENT
Start: 2019-11-21

## 2019-11-21 RX ORDER — OMEGA-3-ACID ETHYL ESTERS 1 G/1
1 CAPSULE, LIQUID FILLED ORAL 2 TIMES DAILY
Qty: 180 CAPSULE | Refills: 3 | Status: SHIPPED | OUTPATIENT
Start: 2019-11-21

## 2019-11-21 RX ORDER — PRAVASTATIN SODIUM 20 MG/1
20 TABLET ORAL NIGHTLY
Qty: 90 TABLET | Refills: 3 | Status: SHIPPED | OUTPATIENT
Start: 2019-11-21

## 2019-11-21 NOTE — PROGRESS NOTES
INTERNAL MEDICINE    Patient Active Problem List   Diagnosis    Hyperlipidemia, mixed    [place] syndrome    Insomnia due to anxiety and fear    HIV disease    Crohn's disease    Allergic rhinitis    Vertigo    Neuropathy    Malignant neoplasm of overlapping sites of bladder       CC:   Chief Complaint   Patient presents with    Follow-up     3 month follow up    Medication Refill       SUBJECTIVE:  Clint England   is a 59 y.o. male  HPI   Quit smoking 3 weeks ago.  Off pain pills.  Only needs benzo to sleep...  Otherwise he has his anxiety under control.    Sees Urology at [facility] for his bladder cancer.  Of note, pt has been + for decades, was on single regimen,double regimen then triple. Bruno CD4 <100, do not recall maximum viral load.  Meds: AZT,#TC,d4T,complera,Isentress,Norvir,Viracept,Viramune,Epizicom,  Atripla, to my recollection.  No OI., but had wasting syndrome.        ROS: Review of Systems   Constitutional: Negative.    HENT: Negative.    Eyes: Negative.    Respiratory: Negative.    Cardiovascular: Negative.    Gastrointestinal: Negative.    Endocrine: Negative.    Genitourinary: Negative.    Musculoskeletal: Negative.    Allergic/Immunologic: Negative.    Neurological: Negative.    Hematological: Negative.    Psychiatric/Behavioral: Positive for sleep disturbance. The patient is nervous/anxious.        Past Medical History:   Diagnosis Date    Allergy     Anxiety     Crohn's disease     Depression     Gilbert's syndrome     Gout     HIV infection     Hyperlipidemia     Sleep apnea        Past Surgical History:   Procedure Laterality Date    ADENOIDECTOMY      EYE SURGERY      HERNIA REPAIR Left     KNEE SURGERY Left        Family History   Problem Relation Age of Onset    Heart disease Mother     Cancer Mother     Heart disease Father     Cancer Sister     Heart disease Brother     No Known Problems Brother        Social History     Tobacco Use    Smoking status:  Former Smoker     Packs/day: 2.00     Types: Cigarettes     Last attempt to quit: 10/31/2019     Years since quittin.0    Smokeless tobacco: Never Used   Substance Use Topics    Alcohol use: Yes     Alcohol/week: 3.0 standard drinks     Types: 3 Shots of liquor per week    Drug use: Yes     Frequency: 2.0 times per week     Types: Marijuana       Social History     Social History Narrative    Not on file       ALLERGIES:   Review of patient's allergies indicates:   Allergen Reactions    Norvir [ritonavir]        MEDS:   Current Outpatient Medications:     CHANTIX 1 mg Tab, , Disp: , Rfl:     CHANTIX STARTING MONTH BOX 0.5 mg (11)- 1 mg (42) tablet, , Disp: , Rfl:     clonazePAM (KLONOPIN) 1 MG tablet, Take 1 tablet (1 mg total) by mouth once daily., Disp: 30 tablet, Rfl: 3    dutasteride (AVODART) 0.5 mg capsule, , Disp: , Rfl:     elviteg-cob-emtri-tenof ALAFEN (GENVOYA) [phone] mg Tab, Take 1 tablet by mouth once daily., Disp: 90 tablet, Rfl: 3    fenofibrate (TRICOR) 145 MG tablet, Take 1 tablet (145 mg total) by mouth once daily., Disp: 90 tablet, Rfl: 3    finasteride (PROPECIA) 1 mg tablet, Take 1 mg by mouth once daily., Disp: , Rfl:     mometasone (NASONEX) 50 mcg/actuation nasal spray, 2 sprays by Nasal route once daily., Disp: 17 g, Rfl: 3    multivitamin (ONE DAILY MULTIVITAMIN) per tablet, Take 1 tablet by mouth once daily., Disp: , Rfl:     omega-3 acid ethyl esters (LOVAZA) 1 gram capsule, Take 1 capsule (1 g total) by mouth 2 (two) times daily., Disp: 180 capsule, Rfl: 3    pravastatin (PRAVACHOL) 20 MG tablet, Take 1 tablet (20 mg total) by mouth nightly., Disp: 90 tablet, Rfl: 3    sildenafil (VIAGRA) 100 MG tablet, Take 1 tablet (100 mg total) by mouth daily as needed for Erectile Dysfunction., Disp: 10 tablet, Rfl: 3    OBJECTIVE:   Vitals:    19 1327   BP: 120/60   BP Location: Left arm   Patient Position: Sitting   BP Method: X-Large (Manual)   Pulse: 66   Resp:  "18   Temp: 98 °F (36.7 °C)   TempSrc: Oral   SpO2: 98%   Weight: 58.5 kg (128 lb 14.4 oz)   Height: 5' 6" (1.676 m)     Body mass index is 20.81 kg/m².    Physical Exam   Constitutional: He is oriented to person, place, and time. He appears well-developed and well-nourished.   HENT:   Head: Normocephalic and atraumatic.   Right Ear: External ear normal.   Left Ear: External ear normal.   Nose: Nose normal.   Mouth/Throat: Oropharynx is clear and moist.   Eyes: Pupils are equal, round, and reactive to light. Conjunctivae and EOM are normal.   Neck: Normal range of motion. Neck supple.   Cardiovascular: Normal rate, regular rhythm and normal heart sounds.   Pulmonary/Chest: Effort normal and breath sounds normal.   Abdominal: Soft. Bowel sounds are normal.   Musculoskeletal: Normal range of motion.   Neurological: He is alert and oriented to person, place, and time.   Skin: Skin is warm and dry.   Psychiatric: He has a normal mood and affect. His behavior is normal.   Nursing note and vitals reviewed.        PERTINENT RESULTS:   CBC:  No results for input(s): WBC, RBC, HGB, HCT, PLT, MCV, MCH, MCHC in the last 2160 hours.  CMP:  No results for input(s): GLU, CALCIUM, ALBUMIN, PROT, NA, K, CO2, CL, BUN, ALKPHOS, ALT, AST, BILITOT in the last 2160 hours.    Invalid input(s): CREATININ  URINALYSIS:  No results for input(s): COLORU, CLARITYU, SPECGRAV, PHUR, PROTEINUA, GLUCOSEU, BILIRUBINCON, BLOODU, WBCU, RBCU, BACTERIA, MUCUS, NITRITE, LEUKOCYTESUR, UROBILINOGEN, HYALINECASTS in the last 2160 hours.   LIPIDS:  No results for input(s): TSH, HDL, CHOL, TRIG, LDLCALC, CHOLHDL, NONHDLCHOL, TOTALCHOLEST in the last 2160 hours.          ASSESSMENT:  Problem List Items Addressed This Visit        Psychiatric    Insomnia due to anxiety and fear (Chronic)       Cardiac/Vascular    Hyperlipidemia, mixed (Chronic)    Relevant Medications    fenofibrate (TRICOR) 145 MG tablet    pravastatin (PRAVACHOL) 20 MG tablet    omega-3 acid " ethyl esters (LOVAZA) 1 gram capsule       ID    HIV disease - Primary (Chronic)    Relevant Medications    elviteg-cob-emtri-tenof ALAFEN (GENVOYA) [phone] mg Tab       Oncology    Malignant neoplasm of overlapping sites of bladder       GI    [place] syndrome (Chronic)       Other    Allergic rhinitis      Other Visit Diagnoses     Need for pneumococcal vaccination        Relevant Orders    Pneumococcal Polysaccharide Vaccine (23 Valent) (SQ/IM)    Erectile dysfunction, unspecified erectile dysfunction type        Relevant Medications    sildenafil (VIAGRA) 100 MG tablet    Anxiety        Relevant Medications    clonazePAM (KLONOPIN) 1 MG tablet          PLAN:   Orders Placed This Encounter    Pneumococcal Polysaccharide Vaccine (23 Valent) (SQ/IM)    elviteg-cob-emtri-tenof ALAFEN (GENVOYA) [phone] mg Tab    fenofibrate (TRICOR) 145 MG tablet    pravastatin (PRAVACHOL) 20 MG tablet    omega-3 acid ethyl esters (LOVAZA) 1 gram capsule    sildenafil (VIAGRA) 100 MG tablet    clonazePAM (KLONOPIN) 1 MG tablet     Orders Placed This Encounter   Procedures    Pneumococcal Polysaccharide Vaccine (23 Valent) (SQ/IM)   He will get his Shingrix.    Follow-up with  A new ID specialist in 2-3 months   Dr. Felisha Ruiz  Internal Medicine

## 2019-12-09 ENCOUNTER — TELEPHONE (OUTPATIENT)
Dept: INTERNAL MEDICINE | Facility: CLINIC | Age: 59
End: 2019-12-09

## 2019-12-09 DIAGNOSIS — F41.9 ANXIETY: ICD-10-CM

## 2019-12-09 RX ORDER — CLONAZEPAM 1 MG/1
1 TABLET, ORALLY DISINTEGRATING ORAL NIGHTLY PRN
Qty: 30 TABLET | Refills: 0 | Status: SHIPPED | OUTPATIENT
Start: 2019-12-09 | End: 2019-12-11 | Stop reason: SDUPTHER

## 2019-12-09 RX ORDER — CLONAZEPAM 1 MG/1
1 TABLET ORAL DAILY
Qty: 30 TABLET | Refills: 3 | Status: CANCELLED | OUTPATIENT
Start: 2019-12-09

## 2019-12-09 NOTE — TELEPHONE ENCOUNTER
----- Message from Anuradha Vora sent at 12/9/2019  3:06 PM CST -----  Contact: pt  Pt called needs a call back in regards to script and phone number for different dr    Ppt can be reached at [phone]

## 2019-12-11 ENCOUNTER — OFFICE VISIT (OUTPATIENT)
Dept: INTERNAL MEDICINE | Facility: CLINIC | Age: 59
End: 2019-12-11
Payer: MEDICARE

## 2019-12-11 VITALS
OXYGEN SATURATION: 98 % | HEART RATE: 60 BPM | TEMPERATURE: 98 F | BODY MASS INDEX: 20.63 KG/M2 | DIASTOLIC BLOOD PRESSURE: 72 MMHG | SYSTOLIC BLOOD PRESSURE: 100 MMHG | HEIGHT: 66 IN | WEIGHT: 128.38 LBS

## 2019-12-11 DIAGNOSIS — B20 HIV DISEASE: Primary | Chronic | ICD-10-CM

## 2019-12-11 DIAGNOSIS — F51.05 INSOMNIA DUE TO ANXIETY AND FEAR: Chronic | ICD-10-CM

## 2019-12-11 DIAGNOSIS — F40.9 INSOMNIA DUE TO ANXIETY AND FEAR: Chronic | ICD-10-CM

## 2019-12-11 DIAGNOSIS — C67.8 MALIGNANT NEOPLASM OF OVERLAPPING SITES OF BLADDER: ICD-10-CM

## 2019-12-11 DIAGNOSIS — K50.919 CROHN'S DISEASE WITH COMPLICATION, UNSPECIFIED GASTROINTESTINAL TRACT LOCATION: ICD-10-CM

## 2019-12-11 DIAGNOSIS — E80.4 GILBERTS SYNDROME: Chronic | ICD-10-CM

## 2019-12-11 DIAGNOSIS — G62.9 NEUROPATHY: ICD-10-CM

## 2019-12-11 DIAGNOSIS — F41.9 ANXIETY: ICD-10-CM

## 2019-12-11 DIAGNOSIS — E78.2 HYPERLIPIDEMIA, MIXED: Chronic | ICD-10-CM

## 2019-12-11 PROCEDURE — 99213 OFFICE O/P EST LOW 20 MIN: CPT | Mod: PBBFAC,PN | Performed by: INTERNAL MEDICINE

## 2019-12-11 PROCEDURE — 99214 OFFICE O/P EST MOD 30 MIN: CPT | Mod: S$PBB,,, | Performed by: INTERNAL MEDICINE

## 2019-12-11 PROCEDURE — 99999 PR PBB SHADOW E&M-EST. PATIENT-LVL III: CPT | Mod: PBBFAC,,, | Performed by: INTERNAL MEDICINE

## 2019-12-11 PROCEDURE — 99999 PR PBB SHADOW E&M-EST. PATIENT-LVL III: ICD-10-PCS | Mod: PBBFAC,,, | Performed by: INTERNAL MEDICINE

## 2019-12-11 PROCEDURE — 99214 PR OFFICE/OUTPT VISIT, EST, LEVL IV, 30-39 MIN: ICD-10-PCS | Mod: S$PBB,,, | Performed by: INTERNAL MEDICINE

## 2019-12-11 RX ORDER — CLONAZEPAM 1 MG/1
1 TABLET, ORALLY DISINTEGRATING ORAL NIGHTLY PRN
Qty: 30 TABLET | Refills: 1 | Status: SHIPPED | OUTPATIENT
Start: 2019-12-11 | End: 2020-01-10

## 2019-12-16 ENCOUNTER — TELEPHONE (OUTPATIENT)
Dept: INTERNAL MEDICINE | Facility: CLINIC | Age: 59
End: 2019-12-16

## 2019-12-16 NOTE — TELEPHONE ENCOUNTER
----- Message from Yodit Saha sent at 12/16/2019  2:44 PM CST -----  Contact: [phone]-self  pt is requesting a callback concerning his medication records and other things. please call.

## 2019-12-17 ENCOUNTER — TELEPHONE (OUTPATIENT)
Dept: FAMILY MEDICINE | Facility: CLINIC | Age: 59
End: 2019-12-17

## 2019-12-17 NOTE — TELEPHONE ENCOUNTER
----- Message from Nataliya Blake sent at 12/17/2019  3:17 PM CST -----  Contact: [phone]/self  Isabela  Patient is requesting a call back to confirm office received the fax he sent. Thanks

## 2019-12-19 ENCOUNTER — TELEPHONE (OUTPATIENT)
Dept: INTERNAL MEDICINE | Facility: CLINIC | Age: 59
End: 2019-12-19

## 2019-12-19 NOTE — TELEPHONE ENCOUNTER
----- Message from Adore Velasco sent at 12/19/2019 11:45 AM CST -----  Contact: self  Patient is requesting a call back concerning a faxed that was sent over. Please call

## 2019-12-19 NOTE — TELEPHONE ENCOUNTER
Patient stated please call him before you fax his paper work. He has to put his phone on fax. Vf/ma

## 2019-12-21 NOTE — PROGRESS NOTES
INTERNAL MEDICINE    Patient Active Problem List   Diagnosis    Hyperlipidemia, mixed    [place] syndrome    Insomnia due to anxiety and fear    HIV disease    Crohn's disease    Allergic rhinitis    Vertigo    Neuropathy    Malignant neoplasm of overlapping sites of bladder       CC:   Chief Complaint   Patient presents with    Sore Throat       SUBJECTIVE:  Clint England   is a 59 y.o. male  HPI   59y/oWM has been HIV + > 25 years. He has been on single,dual and treble regimens. He has had minor blips in his viral log, but never sufficient to be able to do a judy/pheno. He has never had an OI.    His most recent, pressing issue is recurrent bladder cancer.l This is currently horace addressed by Urology.   His chronic issues were:  -polyneuropathy with an opioid dependence, that he suscessfully overcame.  -smoking habit of decades, that again he overcame.  -still has moments of extreme anxiety with insomnia, but slowing weaning those medications.  -Gilbert's  -chron's  -hyperlipidemia      ROS: Review of Systems   Constitutional: Negative.  Negative for activity change and appetite change.   HENT: Positive for rhinorrhea.    Eyes: Negative for pain, redness and visual disturbance.   Respiratory: Negative.  Negative for cough, choking, chest tightness, shortness of breath and wheezing.    Cardiovascular: Negative for chest pain, palpitations and leg swelling.   Gastrointestinal: Negative.    Endocrine: Negative.    Genitourinary: Positive for urgency. Negative for difficulty urinating and frequency.   Musculoskeletal: Negative.    Skin: Negative.    Allergic/Immunologic: Positive for environmental allergies.   Neurological: Positive for numbness and headaches.   Psychiatric/Behavioral: Positive for sleep disturbance. Negative for behavioral problems, confusion, decreased concentration and dysphoric mood. The patient is nervous/anxious.        Past Medical History:   Diagnosis Date    Allergy      Anxiety     Crohn's disease     Depression     Gilbert's syndrome     Gout     HIV infection     Hyperlipidemia     Sleep apnea        Past Surgical History:   Procedure Laterality Date    ADENOIDECTOMY      EYE SURGERY      HERNIA REPAIR Left     KNEE SURGERY Left        Family History   Problem Relation Age of Onset    Heart disease Mother     Cancer Mother     Heart disease Father     Cancer Sister     Heart disease Brother     No Known Problems Brother        Social History     Tobacco Use    Smoking status: Former Smoker     Packs/day: 2.00     Types: Cigarettes     Last attempt to quit: 10/31/2019     Years since quittin.1    Smokeless tobacco: Never Used   Substance Use Topics    Alcohol use: Yes     Alcohol/week: 3.0 standard drinks     Types: 3 Shots of liquor per week    Drug use: Yes     Frequency: 2.0 times per week     Types: Marijuana       Social History     Social History Narrative    Not on file       ALLERGIES:   Review of patient's allergies indicates:   Allergen Reactions    Norvir [ritonavir]        MEDS:   Current Outpatient Medications:     CHANTIX 1 mg Tab, , Disp: , Rfl:     CHANTIX STARTING MONTH BOX 0.5 mg (11)- 1 mg (42) tablet, , Disp: , Rfl:     clonazePAM (KLONOPIN) 1 MG disintegrating tablet, Take 1 tablet (1 mg total) by mouth nightly as needed., Disp: 30 tablet, Rfl: 1    dutasteride (AVODART) 0.5 mg capsule, , Disp: , Rfl:     elviteg-cob-emtri-tenof ALAFEN (GENVOYA) [phone] mg Tab, Take 1 tablet by mouth once daily., Disp: 90 tablet, Rfl: 3    fenofibrate (TRICOR) 145 MG tablet, Take 1 tablet (145 mg total) by mouth once daily., Disp: 90 tablet, Rfl: 3    finasteride (PROPECIA) 1 mg tablet, Take 1 mg by mouth once daily., Disp: , Rfl:     mometasone (NASONEX) 50 mcg/actuation nasal spray, 2 sprays by Nasal route once daily., Disp: 17 g, Rfl: 3    multivitamin (ONE DAILY MULTIVITAMIN) per tablet, Take 1 tablet by mouth once daily., Disp: ,  "Rfl:     omega-3 acid ethyl esters (LOVAZA) 1 gram capsule, Take 1 capsule (1 g total) by mouth 2 (two) times daily., Disp: 180 capsule, Rfl: 3    pravastatin (PRAVACHOL) 20 MG tablet, Take 1 tablet (20 mg total) by mouth nightly., Disp: 90 tablet, Rfl: 3    sildenafil (VIAGRA) 100 MG tablet, Take 1 tablet (100 mg total) by mouth daily as needed for Erectile Dysfunction., Disp: 10 tablet, Rfl: 3    OBJECTIVE:   Vitals:    12/11/19 1459   BP: 100/72   BP Location: Left arm   Patient Position: Sitting   BP Method: Medium (Manual)   Pulse: 60   Temp: 97.9 °F (36.6 °C)   TempSrc: Oral   SpO2: 98%   Weight: 58.2 kg (128 lb 6.4 oz)   Height: 5' 6" (1.676 m)     Body mass index is 20.72 kg/m².    Physical Exam   Constitutional: He is oriented to person, place, and time. He appears well-developed and well-nourished.   HENT:   Head: Normocephalic and atraumatic.   Right Ear: External ear normal.   Left Ear: External ear normal.   Nose: Nose normal.   Mouth/Throat: Oropharynx is clear and moist.   Eyes: Pupils are equal, round, and reactive to light. Conjunctivae and EOM are normal.   Neck: Neck supple.   Cardiovascular: Normal rate, regular rhythm and normal heart sounds.   Pulmonary/Chest: Effort normal and breath sounds normal.   Abdominal: Soft. Bowel sounds are normal.   Musculoskeletal: Normal range of motion.   Neurological: He is alert and oriented to person, place, and time. No cranial nerve deficit. He exhibits normal muscle tone. Coordination normal.   Skin: Skin is warm and dry.   Psychiatric: He has a normal mood and affect. His behavior is normal. Judgment and thought content normal.   Nursing note and vitals reviewed.        PERTINENT RESULTS:   CBC:  No results for input(s): WBC, RBC, HGB, HCT, PLT, MCV, MCH, MCHC in the last 2160 hours.  CMP:  No results for input(s): GLU, CALCIUM, ALBUMIN, PROT, NA, K, CO2, CL, BUN, ALKPHOS, ALT, AST, BILITOT in the last 2160 hours.    Invalid input(s): " CREATININ  URINALYSIS:  No results for input(s): COLORU, CLARITYU, SPECGRAV, PHUR, PROTEINUA, GLUCOSEU, BILIRUBINCON, BLOODU, WBCU, RBCU, BACTERIA, MUCUS, NITRITE, LEUKOCYTESUR, UROBILINOGEN, HYALINECASTS in the last 2160 hours.   LIPIDS:  No results for input(s): TSH, HDL, CHOL, TRIG, LDLCALC, CHOLHDL, NONHDLCHOL, TOTALCHOLEST in the last 2160 hours.          ASSESSMENT:  Problem List Items Addressed This Visit        Neuro    Neuropathy       Psychiatric    Insomnia due to anxiety and fear (Chronic)       Cardiac/Vascular    Hyperlipidemia, mixed (Chronic)       ID    HIV disease - Primary (Chronic)       Oncology    Malignant neoplasm of overlapping sites of bladder       GI    Crohn's disease    Anita syndrome (Chronic)      Other Visit Diagnoses     Anxiety        Relevant Medications    clonazePAM (KLONOPIN) 1 MG disintegrating tablet      Same regimen.    PLAN:   Orders Placed This Encounter    clonazePAM (KLONOPIN) 1 MG disintegrating tablet     No orders of the defined types were placed in this encounter.      Follow-up with  New ID/PCP ( as discussed) in 2-3 months.   Dr. Felisha Ruiz  Internal Medicine

## 2019-12-26 ENCOUNTER — TELEPHONE (OUTPATIENT)
Dept: INTERNAL MEDICINE | Facility: CLINIC | Age: 59
End: 2019-12-26

## 2019-12-26 NOTE — TELEPHONE ENCOUNTER
----- Message from Chilango Painter sent at 12/26/2019  9:45 AM CST -----  Contact: patient  Patient called to speak with the person who is trying to send him a fax as it is not connecting.    Please call [phone] to discuss today.

## 2020-01-10 ENCOUNTER — TELEPHONE (OUTPATIENT)
Dept: INTERNAL MEDICINE | Facility: CLINIC | Age: 60
End: 2020-01-10

## 2020-01-10 NOTE — TELEPHONE ENCOUNTER
Spoke to April on phone. April states that patient is on both a pain medication and was refilling his Klonopin.

## 2020-01-10 NOTE — TELEPHONE ENCOUNTER
----- Message from French Rm sent at 1/10/2020 10:38 AM CST -----  Contact: April allyssa/[facility]  [phone]  April calling to speak with you about Rx clonazePAM (KLONOPIN) 1 MG disintegrating tablet  Please call back to assist at [phone]

## 2020-03-27 ENCOUNTER — PATIENT MESSAGE (OUTPATIENT)
Dept: ORTHOPEDICS | Facility: CLINIC | Age: 60
End: 2020-03-27

## 2021-04-16 ENCOUNTER — PATIENT MESSAGE (OUTPATIENT)
Dept: RESEARCH | Facility: HOSPITAL | Age: 61
End: 2021-04-16

## 2021-08-26 NOTE — TELEPHONE ENCOUNTER
----- Message from Danielito Lawson sent at 8/14/2018  5:39 PM CDT -----  Contact: Pt  Pt is requesting an earlier appt due to having surgery on the follow friday.    Pt can be reached at [phone].    Thank You.  
Changed appt  
good balance

## 2022-06-20 ENCOUNTER — TELEPHONE (OUTPATIENT)
Dept: SURGERY | Facility: CLINIC | Age: 62
End: 2022-06-20
Payer: MEDICARE

## 2022-06-20 NOTE — TELEPHONE ENCOUNTER
----- Message from Kev Griggs sent at 6/20/2022  3:43 PM CDT -----  Contact: pt  Pt's requesting a call back regarding bleeding issues...    Confirmed contact info below:  Contact Name: Clint Samanta  Phone Number: [phone]

## 2024-04-24 ENCOUNTER — TELEPHONE (OUTPATIENT)
Dept: UROLOGY | Facility: CLINIC | Age: 64
End: 2024-04-24
Payer: MEDICARE

## 2024-04-24 NOTE — TELEPHONE ENCOUNTER
"----- Message from Brie Araiza sent at 4/24/2024 12:16 PM CDT -----  Contact: [phone]  Good afternoon,    I will begin collecting records from [place] and complete a proper intake on patient.     -Brie  ----- Message -----  From: Zayra Tovar MA  Sent: 4/24/2024  12:09 PM CDT  To: Brie Araiza; Jennie Menezes RN; #    Good Afternoon,   Pt was booked with Dr Wade for 5/1/2024 stating "annual" but pt has a H/O Malignant neoplasm of overlapping sites of bladder and was a pt of [place] Linares at [place]  prior [place] [facility] so pt is wanting to establish  care with a new provider because he can no longer see Dr Linares.  There is some info in care everywhere, his latest imaging was from 3/6/2021 and there is a report in epic but no images.  He states that she has had 3 surgeries of the bladder in 2016, 2017 and 2018 but the only path I could find was from a gastro procedure. He last saw Dr Linares in early 2023.     Pt did not get any of his records from [place] before Dr Linares left and said he was not offered his records.  Pt is open to postponing his appt if needed while records are being obtained. He is not having any problems at this time onyy that he wants to establish care with Dr Wade going forward.     Pt can be reached at [phone]    Copied jennie pickett and brie  "

## 2024-04-25 ENCOUNTER — TELEPHONE (OUTPATIENT)
Dept: UROLOGY | Facility: CLINIC | Age: 64
End: 2024-04-25
Payer: MEDICARE

## 2024-04-25 NOTE — TELEPHONE ENCOUNTER
"----- Message from Brie Araiza sent at 4/25/2024 11:50 AM CDT -----  Contact: [phone]  New Cancer Patient Intake Documentation     Diagnosis: bladder cancer     Date patient referral received:     Records collected:      Questions asked:  What doctor have you seen for this diagnosis?  Dr. Linares     What imaging have you had?   CT abdomen 8/11/20     Have you been diagnosed with cancer by a biopsy and/or surgery?   path 1/9/24; 1/24/22; 1/18/21; 6/17/19; 6/11/19     Other pertinent info: requesting cytology report from 1/9/24 and the CT images from 8/11/20  ----- Message -----  From: Zayra Tovar MA  Sent: 4/24/2024  12:09 PM CDT  To: rBie Araiza; Jennie Menezes, RN; #    Good Afternoon,   Pt was booked with Dr Wade for 5/1/2024 stating "annual" but pt has a H/O Malignant neoplasm of overlapping sites of bladder and was a pt of Dr Linares at [facility]  prior to [facility] so pt is wanting to establish  care with a new provider because he can no longer see Dr Linares.  There is some info in care everywhere, his latest imaging was from 3/6/2021 and there is a report in epic but no images.  He states that she has had 3 surgeries of the bladder in 2016, 2017 and 2018 but the only path I could find was from a gastro procedure. He last saw Dr Linares in early 2023.     Pt did not get any of his records from [facility] before Dr Linares left and said he was not offered his records.  Pt is open to postponing his appt if needed while records are being obtained. He is not having any problems at this time onyy that he wants to establish care with Dr Wade going forward.     Pt can be reached at [phone]    Copied jennie pickett and brie  " Fall Risk

## 2024-05-01 ENCOUNTER — OFFICE VISIT (OUTPATIENT)
Dept: UROLOGY | Facility: CLINIC | Age: 64
End: 2024-05-01
Payer: MEDICARE

## 2024-05-01 ENCOUNTER — LAB VISIT (OUTPATIENT)
Dept: LAB | Facility: HOSPITAL | Age: 64
End: 2024-05-01
Attending: UROLOGY
Payer: MEDICARE

## 2024-05-01 VITALS
BODY MASS INDEX: 19.27 KG/M2 | SYSTOLIC BLOOD PRESSURE: 99 MMHG | WEIGHT: 119.94 LBS | HEIGHT: 66 IN | HEART RATE: 76 BPM | DIASTOLIC BLOOD PRESSURE: 71 MMHG

## 2024-05-01 DIAGNOSIS — Z12.5 PROSTATE CANCER SCREENING: Primary | ICD-10-CM

## 2024-05-01 DIAGNOSIS — C67.9 MALIGNANT NEOPLASM OF URINARY BLADDER, UNSPECIFIED SITE: ICD-10-CM

## 2024-05-01 DIAGNOSIS — Z12.5 PROSTATE CANCER SCREENING: ICD-10-CM

## 2024-05-01 LAB — COMPLEXED PSA SERPL-MCNC: 0.2 NG/ML (ref 0–4)

## 2024-05-01 PROCEDURE — 99204 OFFICE O/P NEW MOD 45 MIN: CPT | Mod: S$PBB,,, | Performed by: UROLOGY

## 2024-05-01 PROCEDURE — 36415 COLL VENOUS BLD VENIPUNCTURE: CPT | Performed by: UROLOGY

## 2024-05-01 PROCEDURE — 99999 PR PBB SHADOW E&M-EST. PATIENT-LVL III: CPT | Mod: PBBFAC,,, | Performed by: UROLOGY

## 2024-05-01 PROCEDURE — 99213 OFFICE O/P EST LOW 20 MIN: CPT | Mod: PBBFAC | Performed by: UROLOGY

## 2024-05-01 PROCEDURE — 84153 ASSAY OF PSA TOTAL: CPT | Performed by: UROLOGY

## 2024-05-01 RX ORDER — FENOFIBRATE 54 MG/1
54 TABLET ORAL
COMMUNITY

## 2024-05-01 RX ORDER — DIAZEPAM 5 MG/1
5 TABLET ORAL ONCE
Qty: 1 TABLET | Refills: 0 | Status: SHIPPED | OUTPATIENT
Start: 2024-05-01 | End: 2024-05-01

## 2024-05-01 RX ORDER — FLUTICASONE PROPIONATE 50 MCG
1 SPRAY, SUSPENSION (ML) NASAL
COMMUNITY
Start: 2024-04-02

## 2024-05-01 NOTE — PROGRESS NOTES
[facility]  Urologic Oncology      Date of Service: 05/01/2024    Chief Complaint/Reason for Consultation: NMIBC    Requesting Provider:   Self    History of Present Illness:   Patient is a 63 y.o. male presenting for evaluation of NMIBC.  He was originally diagnosed in 2016, and was told this was a low risk low-grade nonmuscle invasive bladder cancer.  In 2016, he never received intravesical chemotherapy, then had a recurrence of low risk nonmuscle invasive bladder cancer, which according to outside records was stage TA in 2018.  In June of 2019, he had an area of recurrence and underwent TURBT, the pathology this revealed papillary hyperplasia and he received postoperative chemotherapy.  He was follow up with Dr. Baneags at [facility] in his last cystoscopy was approximately a year ago.    He reports FH of prostate cancer in his father.   He has a history of HIV but viral load has been undetectable.   History of inguinal hernia repair and cholecystectomy.     Current Problem List:  Patient Active Problem List    Diagnosis Date Noted    Malignant neoplasm of overlapping sites of bladder 11/21/2019    Neuropathy 08/24/2017    Vertigo 06/27/2017    Crohn's disease 08/25/2016    Allergic rhinitis 08/25/2016    Hyperlipidemia, mixed 07/21/2015    [place]ountain syndrome 07/21/2015    Insomnia due to anxiety and fear 07/21/2015    HIV disease 07/21/2015        Allergies:  Review of patient's allergies indicates:   Allergen Reactions    Norvir [ritonavir]         Medications per EMR:  (Not in a hospital admission)      Past Medical History:  Past Medical History:   Diagnosis Date    Allergy     Anxiety     Crohn's disease     Depression     Gilbert's syndrome     Gout     HIV infection     Hyperlipidemia     Sleep apnea         Past Surgical History:  Past Surgical History:   Procedure Laterality Date    ADENOIDECTOMY      EYE SURGERY      HERNIA REPAIR Left     KNEE SURGERY Left         Family History:  Family History  "  Problem Relation Name Age of Onset    Heart disease Mother      Cancer Mother      Heart disease Father      Cancer Sister      Heart disease Brother      No Known Problems Brother 2         Social History:  Social History     Tobacco Use    Smoking status: Former     Current packs/day: 0.00     Types: Cigarettes     Quit date: 10/31/2019     Years since quittin.5    Smokeless tobacco: Never   Substance Use Topics    Alcohol use: Yes     Alcohol/week: 3.0 standard drinks of alcohol     Types: 3 Shots of liquor per week          OBJECTIVE:     Vitals:    24 0914   BP: 99/71   Pulse: 76   Weight: 54.4 kg (119 lb 14.9 oz)   Height: 5' 6" (1.676 m)          General Appearance: Alert, cooperative, no distress  Head: Normocephalic  Eyes: Clear conjunctiva  Neck: No obvious LND or JVD  Lungs: Normal chest excursion, no accessory muscle use  Extremities: Atraumatic   Lymph Nodes: No appreciable lymph adenopathy  Neurologic: No gross gait, motor or sensory deficits        LAB:    CBC:  Lab Results   Component Value Date    WBC 6.15 2022    HGB 15.1 2022    HCT 45.7 2022    MCV 95 2022     2022         BMP:  Lab Results   Component Value Date     2022    K 4.4 2022     2022    CO2 25 2022    BUN 22 (H) 2022    CREATININE 1.3 2022    CALCIUM 10.2 2022    ANIONGAP 10 2022           IMAGING:          ASSESSMENT/PLAN:     Assessment: ORAL is a 63M with history of NMIBC diagnosed in 2016, last recurrence in 2018.     Plan:    PSA screening and prostate cancer screening:  We had a long thorough discussion regarding the controversy, pros, cons, alternatives of prostate cancer screening and PSA screening in men. As of today, he has never been diagnosed with prostate cancer.  We discussed the AUA recommendations to discontinue prostate cancer screening at age 75.  Acknowledging that this depends on the health of the patient, this " is typically shared decision-making between the patient and the physician team.  He understands that the purpose of PSA screening is to identify clinically significant prostate cancers which have a higher potential to progress and ultimately lead to patient mortality and metastasis.  We discussed this in the context that we wish to avoid over treatment and decrease the morbidity associated with elevated PSA workup and prostate cancer screening which includes prostate biopsy and MRI with possible additional testing.      We had a discussion about non muscle invasive bladder cancer and NCCN guidelines recommending surveillance cystoscopy annually up to 5 years after diagnosis. We discussed surveillance after 5 years would be a shared decision making discussion. Due to his history of HIV and immunosuppression that continuing surveillance annually or every 2 years would be an option if that is something he is interested in. He has expressed anxiety around in-office cystoscopy in the past.     --Will schedule for surveillance cystoscopy next available. Urine cytology at that time.   --Ordering a valium prior to cystoscopy  --Will check a PSA today  --Will defer DIMPLE until time of in-office cystoscopy      I spent a total of 45 minutes on the day of the visit.This includes face to face time and non-face to face time preparing to see the patient (eg, review of tests), obtaining and/or reviewing separately obtained history, documenting clinical information in the electronic or other health record, independently interpreting results and communicating results to the patient/family/caregiver, or care coordinator.

## 2024-05-13 ENCOUNTER — PROCEDURE VISIT (OUTPATIENT)
Dept: UROLOGY | Facility: CLINIC | Age: 64
End: 2024-05-13
Payer: MEDICARE

## 2024-05-13 VITALS
DIASTOLIC BLOOD PRESSURE: 74 MMHG | BODY MASS INDEX: 19.68 KG/M2 | HEART RATE: 75 BPM | RESPIRATION RATE: 17 BRPM | WEIGHT: 121.94 LBS | SYSTOLIC BLOOD PRESSURE: 118 MMHG | TEMPERATURE: 97 F

## 2024-05-13 DIAGNOSIS — C67.9 MALIGNANT NEOPLASM OF URINARY BLADDER, UNSPECIFIED SITE: ICD-10-CM

## 2024-05-13 PROCEDURE — 52000 CYSTOURETHROSCOPY: CPT | Mod: PBBFAC | Performed by: UROLOGY

## 2024-05-13 RX ORDER — LIDOCAINE HYDROCHLORIDE 20 MG/ML
JELLY TOPICAL
Status: COMPLETED | OUTPATIENT
Start: 2024-05-13 | End: 2024-05-13

## 2024-05-13 RX ADMIN — LIDOCAINE HYDROCHLORIDE: 20 JELLY TOPICAL at 01:05

## 2024-05-13 NOTE — PATIENT INSTRUCTIONS
What to Expect After a Cystoscopy  For the next 24-48 hours, you may feel a mild burning when you urinate. This burning is normal and expected. Drink plenty of water to dilute the urine to help relieve the burning sensation. You may also see a small amount of blood in your urine after the procedure.    Unless you are already taking antibiotics, you may be given an antibiotic after the test to prevent infection.    Signs and Symptoms to Report  Call the [facility] at [phone] if you develop any of the following:  Fever of 101 degrees or higher  Chills or persistent bleeding  Inability to urinate

## 2024-05-13 NOTE — PROCEDURES
Cystoscopy    Date/Time: 5/13/2024 1:30 PM    Performed by: Uche Wade MD  Authorized by: Uche Wade MD        Office Cystoscopy Procedure Note    Date of Procedure: 05/13/2024    Indication:  Urothelial carcinoma of the bladder      Informed consent:  The risks, benefits, complications, treatment options, and expected outcomes were discussed with the patient. The patient concurred with the proposed plan and provided informed consent.     Anesthesia: Lidocaine jelly 2%     Antibiotic prophylaxis: None     Procedure:  The patient was placed in the lithotomy position, was prepped and draped in the usual manner using sterile technique, and 2% lidocaine jelly instilled into the urethra.  A procedural timeout was performed identifying the patient, all in attendance were in agreement, including the patient. A 17 F flexible cystoscope was then inserted into the urethra and the urethra and bladder carefully examined.  The following findings were noted:     Findings:   1. Normal anterior urethra without evidence of strictures or tumors   2. Prostatic urethra open without signs of obstruction  3. Bladder free of masses, tumors, lesions.  Ureteral orifices in orthotopic position bilaterally    4. Digital rectal exam performed with no evidence of palpable nodules or tumors    Specimens: None   Complications: None; patient tolerated the procedure well          Disposition: Home after brief observation   Condition: Stable     Assessment:  63-year-old male with noninvasive bladder cancer, presumably low risk    Plan:   Follow up in 1 year with repeat cystoscopy    Attending Attestation:      I personally performed the procedure.     Uche Wade  1:31 PM  05/13/2024

## 2024-09-11 NOTE — TELEPHONE ENCOUNTER
----- Message from French Rm sent at 3/13/2019  3:10 PM CDT -----  Contact: [phone]/self  Patient requesting to speak with you concerning his medication.  Patient states he only wants to speak with the nurse.            Please call and advise    
Spoke with patient and states that he needs a refill on his ativan 2mg prescription. Explained to the patient that he is due for a follow up visit. Gave him the choice if he wanted to see one of our providers or wait until Dr. Ruiz comes back, he states that he doesn't want to see a stranger. Reassured patient that our providers work closely with Dr. Ruiz and they do have access to his records. Patient would like to know if he could get a refill until Dr. Ruiz comes back. He has an appt on 4/10/19.  
Detail Level: Detailed
Detail Level: Simple

## 2025-05-09 ENCOUNTER — TELEPHONE (OUTPATIENT)
Dept: UROLOGY | Facility: CLINIC | Age: 65
End: 2025-05-09
Payer: MEDICARE

## 2025-05-09 NOTE — TELEPHONE ENCOUNTER
JOHNATHANM reminding patient of appointment 05/12 @1300 at [facility] 2nd floor Urology. Please arrive 15 min prior and need to obtain a urine sample.

## 2025-05-30 ENCOUNTER — TELEPHONE (OUTPATIENT)
Dept: UROLOGY | Facility: CLINIC | Age: 65
End: 2025-05-30
Payer: MEDICARE

## 2025-06-02 ENCOUNTER — PATIENT MESSAGE (OUTPATIENT)
Dept: UROLOGY | Facility: CLINIC | Age: 65
End: 2025-06-02
Payer: MEDICARE

## 2025-06-02 ENCOUNTER — PROCEDURE VISIT (OUTPATIENT)
Dept: UROLOGY | Facility: CLINIC | Age: 65
End: 2025-06-02
Payer: MEDICARE

## 2025-06-02 VITALS
HEART RATE: 84 BPM | SYSTOLIC BLOOD PRESSURE: 133 MMHG | DIASTOLIC BLOOD PRESSURE: 74 MMHG | WEIGHT: 125 LBS | RESPIRATION RATE: 16 BRPM | BODY MASS INDEX: 20.18 KG/M2

## 2025-06-02 DIAGNOSIS — C67.9 MALIGNANT NEOPLASM OF URINARY BLADDER, UNSPECIFIED SITE: Primary | ICD-10-CM

## 2025-06-02 PROCEDURE — 52000 CYSTOURETHROSCOPY: CPT | Mod: PBBFAC | Performed by: UROLOGY

## 2025-06-02 RX ORDER — OXYBUTYNIN CHLORIDE 10 MG/1
10 TABLET, EXTENDED RELEASE ORAL DAILY
Qty: 30 TABLET | Refills: 12 | Status: SHIPPED | OUTPATIENT
Start: 2025-06-02 | End: 2025-07-02

## 2025-06-02 RX ORDER — LIDOCAINE HYDROCHLORIDE 20 MG/ML
JELLY TOPICAL
Status: COMPLETED | OUTPATIENT
Start: 2025-06-02 | End: 2025-06-02

## 2025-06-02 RX ADMIN — LIDOCAINE HYDROCHLORIDE 5 ML: 20 JELLY TOPICAL at 01:06

## 2025-06-03 DIAGNOSIS — C67.9 MALIGNANT NEOPLASM OF URINARY BLADDER, UNSPECIFIED SITE: Primary | ICD-10-CM

## 2025-08-21 ENCOUNTER — TELEPHONE (OUTPATIENT)
Dept: DERMATOLOGY | Facility: CLINIC | Age: 65
End: 2025-08-21
Payer: MEDICARE

## 2025-08-25 ENCOUNTER — TELEPHONE (OUTPATIENT)
Dept: DERMATOLOGY | Facility: CLINIC | Age: 65
End: 2025-08-25
Payer: MEDICARE